# Patient Record
Sex: MALE | Race: WHITE | Employment: OTHER
[De-identification: names, ages, dates, MRNs, and addresses within clinical notes are randomized per-mention and may not be internally consistent; named-entity substitution may affect disease eponyms.]

---

## 2017-01-01 ENCOUNTER — HOME CARE VISIT (OUTPATIENT)
Dept: SCHEDULING | Facility: HOME HEALTH | Age: 77
End: 2017-01-01
Payer: COMMERCIAL

## 2017-01-01 ENCOUNTER — HOME CARE VISIT (OUTPATIENT)
Dept: HOSPICE | Facility: HOSPICE | Age: 77
End: 2017-01-01
Payer: COMMERCIAL

## 2017-01-01 ENCOUNTER — HOSPICE ADMISSION (OUTPATIENT)
Dept: HOSPICE | Facility: HOSPICE | Age: 77
End: 2017-01-01
Payer: COMMERCIAL

## 2017-01-01 ENCOUNTER — HOSPICE CERTIFICATION ENCOUNTER (OUTPATIENT)
Dept: PALLATIVE CARE | Age: 77
End: 2017-01-01

## 2017-01-01 VITALS
DIASTOLIC BLOOD PRESSURE: 58 MMHG | BODY MASS INDEX: 24.92 KG/M2 | HEIGHT: 71 IN | WEIGHT: 178 LBS | HEART RATE: 92 BPM | OXYGEN SATURATION: 97 % | SYSTOLIC BLOOD PRESSURE: 126 MMHG | RESPIRATION RATE: 16 BRPM

## 2017-01-01 VITALS
HEART RATE: 96 BPM | TEMPERATURE: 98.3 F | DIASTOLIC BLOOD PRESSURE: 62 MMHG | OXYGEN SATURATION: 96 % | SYSTOLIC BLOOD PRESSURE: 148 MMHG | RESPIRATION RATE: 18 BRPM

## 2017-01-01 VITALS
DIASTOLIC BLOOD PRESSURE: 56 MMHG | OXYGEN SATURATION: 95 % | RESPIRATION RATE: 18 BRPM | HEART RATE: 75 BPM | TEMPERATURE: 98.2 F | SYSTOLIC BLOOD PRESSURE: 104 MMHG

## 2017-01-01 PROCEDURE — G0299 HHS/HOSPICE OF RN EA 15 MIN: HCPCS

## 2017-01-01 PROCEDURE — 0651 HSPC ROUTINE HOME CARE

## 2017-01-01 PROCEDURE — T4527 ADULT SIZE PULL-ON LG: HCPCS

## 2017-01-01 PROCEDURE — S9470 NUTRITIONAL COUNSELING, DIET: HCPCS

## 2017-01-01 PROCEDURE — G0155 HHCP-SVS OF CSW,EA 15 MIN: HCPCS

## 2017-01-01 PROCEDURE — G0156 HHCP-SVS OF AIDE,EA 15 MIN: HCPCS

## 2017-01-01 PROCEDURE — 3336500001 HSPC ELECTION

## 2017-01-01 PROCEDURE — A6250 SKIN SEAL PROTECT MOISTURIZR: HCPCS

## 2017-01-01 PROCEDURE — T4541 LARGE DISPOSABLE UNDERPAD: HCPCS

## 2017-01-01 PROCEDURE — HOSPICE MEDICATION HC HH HOSPICE MEDICATION

## 2017-12-07 NOTE — CERTIFICATE OF TERMINAL ILLNESS
Hospice Physician Admission Narrative     Principle Hospice Diagnosis: Lewy body dementia with behavioral disturbance  Diagnoses RELATED to the terminal prognosis: weight loss, protein calorie malnutrition  Other Diagnoses:      HOSPICE NARRATIVE COMPOSED BY PHYSICIAN   Do not cut and paste chart information other than imaging findings    Ildefonso George is a 69y.o. year old who was admitted to [facility]. He has suffered for many years with Lewy body dementia diagnosed in 2011. He had to move to a memory facility in May 2017 due to continued decline and agitation. He has had a 15% weight loss (210lbs to 178lbs) in the last 6 months despite encouragement to eat and support for ADLs including feeding at times. He remains very agitated and is unable to sit for any significant time, exhibits paranoid and angry behaviors requiring advanced medication management. He is showing signs of aspiration with coughing with thin liquids and no artificial nutrition is desired. There is variability in FAST from  to [facility] but this is a decline from just the last 3 months. Karnofsky is 40-50 which is also a decline from the last 3 months as well where it was 60-70.       The patient/family chose comfort measures with the support of Hospice.   ______________________________________________________________________

## 2018-01-01 ENCOUNTER — HOME CARE VISIT (OUTPATIENT)
Dept: SCHEDULING | Facility: HOME HEALTH | Age: 78
End: 2018-01-01
Payer: COMMERCIAL

## 2018-01-01 ENCOUNTER — HOME CARE VISIT (OUTPATIENT)
Dept: HOSPICE | Facility: HOSPICE | Age: 78
End: 2018-01-01
Payer: COMMERCIAL

## 2018-01-01 ENCOUNTER — HOSPICE CERTIFICATION ENCOUNTER (OUTPATIENT)
Dept: HOSPICE | Facility: HOSPICE | Age: 78
End: 2018-01-01

## 2018-01-01 ENCOUNTER — HOSPITAL ENCOUNTER (INPATIENT)
Age: 78
LOS: 14 days | End: 2018-03-29
Attending: INTERNAL MEDICINE | Admitting: INTERNAL MEDICINE

## 2018-01-01 VITALS — DIASTOLIC BLOOD PRESSURE: 53 MMHG | SYSTOLIC BLOOD PRESSURE: 93 MMHG | HEART RATE: 72 BPM

## 2018-01-01 VITALS — OXYGEN SATURATION: 94 % | HEART RATE: 78 BPM | RESPIRATION RATE: 18 BRPM | TEMPERATURE: 98.2 F

## 2018-01-01 VITALS — RESPIRATION RATE: 18 BRPM | HEART RATE: 80 BPM

## 2018-01-01 VITALS
SYSTOLIC BLOOD PRESSURE: 132 MMHG | HEART RATE: 93 BPM | RESPIRATION RATE: 18 BRPM | TEMPERATURE: 98 F | DIASTOLIC BLOOD PRESSURE: 70 MMHG | OXYGEN SATURATION: 96 %

## 2018-01-01 VITALS
SYSTOLIC BLOOD PRESSURE: 72 MMHG | TEMPERATURE: 102.3 F | RESPIRATION RATE: 12 BRPM | HEART RATE: 113 BPM | OXYGEN SATURATION: 93 % | DIASTOLIC BLOOD PRESSURE: 38 MMHG

## 2018-01-01 VITALS
HEART RATE: 76 BPM | OXYGEN SATURATION: 99 % | DIASTOLIC BLOOD PRESSURE: 63 MMHG | TEMPERATURE: 97.3 F | RESPIRATION RATE: 16 BRPM | SYSTOLIC BLOOD PRESSURE: 108 MMHG

## 2018-01-01 VITALS — HEART RATE: 84 BPM | OXYGEN SATURATION: 94 % | RESPIRATION RATE: 18 BRPM | TEMPERATURE: 97 F

## 2018-01-01 VITALS
RESPIRATION RATE: 18 BRPM | TEMPERATURE: 97.9 F | SYSTOLIC BLOOD PRESSURE: 114 MMHG | HEART RATE: 76 BPM | DIASTOLIC BLOOD PRESSURE: 68 MMHG | OXYGEN SATURATION: 95 %

## 2018-01-01 VITALS — OXYGEN SATURATION: 95 % | HEART RATE: 72 BPM

## 2018-01-01 VITALS
TEMPERATURE: 97.3 F | HEART RATE: 73 BPM | OXYGEN SATURATION: 98 % | SYSTOLIC BLOOD PRESSURE: 109 MMHG | DIASTOLIC BLOOD PRESSURE: 65 MMHG | RESPIRATION RATE: 16 BRPM

## 2018-01-01 VITALS
RESPIRATION RATE: 16 BRPM | HEART RATE: 82 BPM | OXYGEN SATURATION: 97 % | DIASTOLIC BLOOD PRESSURE: 78 MMHG | TEMPERATURE: 98.3 F | SYSTOLIC BLOOD PRESSURE: 116 MMHG

## 2018-01-01 VITALS — SYSTOLIC BLOOD PRESSURE: 101 MMHG | HEART RATE: 101 BPM | DIASTOLIC BLOOD PRESSURE: 64 MMHG

## 2018-01-01 VITALS — TEMPERATURE: 97 F | RESPIRATION RATE: 18 BRPM | HEART RATE: 88 BPM

## 2018-01-01 VITALS
OXYGEN SATURATION: 95 % | TEMPERATURE: 98 F | SYSTOLIC BLOOD PRESSURE: 128 MMHG | RESPIRATION RATE: 18 BRPM | DIASTOLIC BLOOD PRESSURE: 70 MMHG | HEART RATE: 77 BPM

## 2018-01-01 VITALS — TEMPERATURE: 99.8 F

## 2018-01-01 DIAGNOSIS — G31.83 LEWY BODY DEMENTIA WITH BEHAVIORAL DISTURBANCE (HCC): ICD-10-CM

## 2018-01-01 DIAGNOSIS — R45.1 RESTLESSNESS AND AGITATION: ICD-10-CM

## 2018-01-01 DIAGNOSIS — F02.818 LEWY BODY DEMENTIA WITH BEHAVIORAL DISTURBANCE (HCC): ICD-10-CM

## 2018-01-01 DIAGNOSIS — R52 DIFFUSE PAIN: ICD-10-CM

## 2018-01-01 DIAGNOSIS — R53.83 FATIGUE, UNSPECIFIED TYPE: ICD-10-CM

## 2018-01-01 PROCEDURE — 74011250636 HC RX REV CODE- 250/636: Performed by: NURSE PRACTITIONER

## 2018-01-01 PROCEDURE — G0156 HHCP-SVS OF AIDE,EA 15 MIN: HCPCS

## 2018-01-01 PROCEDURE — 0651 HSPC ROUTINE HOME CARE

## 2018-01-01 PROCEDURE — 74011250637 HC RX REV CODE- 250/637: Performed by: INTERNAL MEDICINE

## 2018-01-01 PROCEDURE — A6250 SKIN SEAL PROTECT MOISTURIZR: HCPCS

## 2018-01-01 PROCEDURE — G0155 HHCP-SVS OF CSW,EA 15 MIN: HCPCS

## 2018-01-01 PROCEDURE — A9270 NON-COVERED ITEM OR SERVICE: HCPCS

## 2018-01-01 PROCEDURE — T4543 ADULT DISP BRIEF/DIAP ABV XL: HCPCS

## 2018-01-01 PROCEDURE — HOSPICE MEDICATION HC HH HOSPICE MEDICATION

## 2018-01-01 PROCEDURE — 3336590001 HSPC ROOM AND BOARD

## 2018-01-01 PROCEDURE — 74011000250 HC RX REV CODE- 250: Performed by: NURSE PRACTITIONER

## 2018-01-01 PROCEDURE — 74011000250 HC RX REV CODE- 250: Performed by: PHYSICAL MEDICINE & REHABILITATION

## 2018-01-01 PROCEDURE — A6413 ADHESIVE BANDAGE, FIRST-AID: HCPCS

## 2018-01-01 PROCEDURE — T4527 ADULT SIZE PULL-ON LG: HCPCS

## 2018-01-01 PROCEDURE — HHS10554 SHAMPOO/BODY WASH 8 OZ ALOE VESTA

## 2018-01-01 PROCEDURE — A4927 NON-STERILE GLOVES: HCPCS

## 2018-01-01 PROCEDURE — G0299 HHS/HOSPICE OF RN EA 15 MIN: HCPCS

## 2018-01-01 PROCEDURE — 74011250637 HC RX REV CODE- 250/637: Performed by: NURSE PRACTITIONER

## 2018-01-01 PROCEDURE — S9470 NUTRITIONAL COUNSELING, DIET: HCPCS

## 2018-01-01 PROCEDURE — T4523 ADULT SIZE BRIEF/DIAPER LG: HCPCS

## 2018-01-01 PROCEDURE — 0656 HSPC GENERAL INPATIENT

## 2018-01-01 PROCEDURE — A4216 STERILE WATER/SALINE, 10 ML: HCPCS

## 2018-01-01 PROCEDURE — T4541 LARGE DISPOSABLE UNDERPAD: HCPCS

## 2018-01-01 PROCEDURE — E0325 URINAL MALE JUG-TYPE: HCPCS

## 2018-01-01 PROCEDURE — A6216 NON-STERILE GAUZE<=16 SQ IN: HCPCS

## 2018-01-01 RX ORDER — DOCUSATE SODIUM 100 MG/1
100 CAPSULE, LIQUID FILLED ORAL 2 TIMES DAILY
Status: DISCONTINUED | OUTPATIENT
Start: 2018-01-01 | End: 2018-01-01

## 2018-01-01 RX ORDER — ATROPINE SULFATE 10 MG/ML
2 SOLUTION/ DROPS OPHTHALMIC
Status: DISCONTINUED | OUTPATIENT
Start: 2018-01-01 | End: 2018-01-01 | Stop reason: HOSPADM

## 2018-01-01 RX ORDER — HALOPERIDOL 2 MG/ML
2 SOLUTION ORAL
Status: DISCONTINUED | OUTPATIENT
Start: 2018-01-01 | End: 2018-01-01

## 2018-01-01 RX ORDER — TAMSULOSIN HYDROCHLORIDE 0.4 MG/1
0.4 CAPSULE ORAL DAILY
Status: DISCONTINUED | OUTPATIENT
Start: 2018-01-01 | End: 2018-01-01

## 2018-01-01 RX ORDER — SERTRALINE HYDROCHLORIDE 50 MG/1
150 TABLET, FILM COATED ORAL DAILY
Status: DISCONTINUED | OUTPATIENT
Start: 2018-01-01 | End: 2018-01-01

## 2018-01-01 RX ORDER — MORPHINE SULFATE 20 MG/ML
5 SOLUTION ORAL
Status: DISCONTINUED | OUTPATIENT
Start: 2018-01-01 | End: 2018-01-01

## 2018-01-01 RX ORDER — FACIAL-BODY WIPES
10 EACH TOPICAL DAILY PRN
Status: DISCONTINUED | OUTPATIENT
Start: 2018-01-01 | End: 2018-01-01 | Stop reason: HOSPADM

## 2018-01-01 RX ORDER — LORAZEPAM 2 MG/ML
0.5 INJECTION INTRAMUSCULAR EVERY 8 HOURS
Status: DISCONTINUED | OUTPATIENT
Start: 2018-01-01 | End: 2018-01-01 | Stop reason: HOSPADM

## 2018-01-01 RX ORDER — HYDROMORPHONE HYDROCHLORIDE 2 MG/ML
1 INJECTION, SOLUTION INTRAMUSCULAR; INTRAVENOUS; SUBCUTANEOUS EVERY 4 HOURS
Status: DISCONTINUED | OUTPATIENT
Start: 2018-01-01 | End: 2018-01-01 | Stop reason: HOSPADM

## 2018-01-01 RX ORDER — BUSPIRONE HYDROCHLORIDE 10 MG/1
10 TABLET ORAL 3 TIMES DAILY
Status: DISCONTINUED | OUTPATIENT
Start: 2018-01-01 | End: 2018-01-01

## 2018-01-01 RX ORDER — HALOPERIDOL 5 MG/ML
5 INJECTION INTRAMUSCULAR ONCE
Status: COMPLETED | OUTPATIENT
Start: 2018-01-01 | End: 2018-01-01

## 2018-01-01 RX ORDER — IBUPROFEN 200 MG
200 TABLET ORAL
Status: DISCONTINUED | OUTPATIENT
Start: 2018-01-01 | End: 2018-01-01

## 2018-01-01 RX ORDER — HALOPERIDOL 2 MG/ML
1 SOLUTION ORAL EVERY 6 HOURS
Status: DISCONTINUED | OUTPATIENT
Start: 2018-01-01 | End: 2018-01-01

## 2018-01-01 RX ORDER — MORPHINE SULFATE 20 MG/ML
5 SOLUTION ORAL EVERY 4 HOURS
Status: DISCONTINUED | OUTPATIENT
Start: 2018-01-01 | End: 2018-01-01

## 2018-01-01 RX ORDER — HYDROMORPHONE HYDROCHLORIDE 2 MG/ML
0.5 INJECTION, SOLUTION INTRAMUSCULAR; INTRAVENOUS; SUBCUTANEOUS
Status: DISCONTINUED | OUTPATIENT
Start: 2018-01-01 | End: 2018-01-01 | Stop reason: HOSPADM

## 2018-01-01 RX ORDER — GLYCOPYRROLATE 0.2 MG/ML
0.2 INJECTION INTRAMUSCULAR; INTRAVENOUS
Status: DISCONTINUED | OUTPATIENT
Start: 2018-01-01 | End: 2018-01-01 | Stop reason: HOSPADM

## 2018-01-01 RX ORDER — DUTASTERIDE 0.5 MG/1
0.5 CAPSULE, LIQUID FILLED ORAL DAILY
Status: DISCONTINUED | OUTPATIENT
Start: 2018-01-01 | End: 2018-01-01

## 2018-01-01 RX ORDER — MORPHINE SULFATE 2 MG/ML
2 INJECTION, SOLUTION INTRAMUSCULAR; INTRAVENOUS
Status: DISCONTINUED | OUTPATIENT
Start: 2018-01-01 | End: 2018-01-01

## 2018-01-01 RX ORDER — HALOPERIDOL 1 MG/1
0.5 TABLET ORAL 2 TIMES DAILY
Status: DISCONTINUED | OUTPATIENT
Start: 2018-01-01 | End: 2018-01-01

## 2018-01-01 RX ORDER — HALOPERIDOL 5 MG/ML
4 INJECTION INTRAMUSCULAR EVERY 6 HOURS
Status: DISCONTINUED | OUTPATIENT
Start: 2018-01-01 | End: 2018-01-01

## 2018-01-01 RX ORDER — SCOLOPAMINE TRANSDERMAL SYSTEM 1 MG/1
1 PATCH, EXTENDED RELEASE TRANSDERMAL
Status: DISCONTINUED | OUTPATIENT
Start: 2018-01-01 | End: 2018-01-01

## 2018-01-01 RX ORDER — TRAZODONE HYDROCHLORIDE 50 MG/1
50 TABLET ORAL
Status: DISCONTINUED | OUTPATIENT
Start: 2018-01-01 | End: 2018-01-01

## 2018-01-01 RX ORDER — SCOLOPAMINE TRANSDERMAL SYSTEM 1 MG/1
1 PATCH, EXTENDED RELEASE TRANSDERMAL
Status: DISCONTINUED | OUTPATIENT
Start: 2018-01-01 | End: 2018-01-01 | Stop reason: HOSPADM

## 2018-01-01 RX ORDER — MORPHINE SULFATE 4 MG/ML
4 INJECTION, SOLUTION INTRAMUSCULAR; INTRAVENOUS EVERY 6 HOURS
Status: DISCONTINUED | OUTPATIENT
Start: 2018-01-01 | End: 2018-01-01

## 2018-01-01 RX ORDER — DIAZEPAM 10 MG/2ML
2.5 INJECTION INTRAMUSCULAR EVERY 8 HOURS
Status: DISCONTINUED | OUTPATIENT
Start: 2018-01-01 | End: 2018-01-01

## 2018-01-01 RX ORDER — ACETAMINOPHEN 325 MG/1
650 TABLET ORAL
Status: DISCONTINUED | OUTPATIENT
Start: 2018-01-01 | End: 2018-01-01

## 2018-01-01 RX ORDER — DIAZEPAM 10 MG/2ML
5 INJECTION INTRAMUSCULAR EVERY 8 HOURS
Status: DISCONTINUED | OUTPATIENT
Start: 2018-01-01 | End: 2018-01-01

## 2018-01-01 RX ORDER — HALOPERIDOL 2 MG/ML
1 SOLUTION ORAL
Status: DISCONTINUED | OUTPATIENT
Start: 2018-01-01 | End: 2018-01-01

## 2018-01-01 RX ORDER — QUETIAPINE FUMARATE 25 MG/1
75 TABLET, FILM COATED ORAL 3 TIMES DAILY
Status: DISCONTINUED | OUTPATIENT
Start: 2018-01-01 | End: 2018-01-01

## 2018-01-01 RX ORDER — CIPROFLOXACIN 500 MG/1
500 TABLET ORAL 2 TIMES DAILY
Status: DISCONTINUED | OUTPATIENT
Start: 2018-01-01 | End: 2018-01-01

## 2018-01-01 RX ORDER — LORAZEPAM 0.5 MG/1
0.5 TABLET ORAL
Status: DISCONTINUED | OUTPATIENT
Start: 2018-01-01 | End: 2018-01-01

## 2018-01-01 RX ORDER — GABAPENTIN 100 MG/1
200 CAPSULE ORAL 3 TIMES DAILY
Status: DISCONTINUED | OUTPATIENT
Start: 2018-01-01 | End: 2018-01-01

## 2018-01-01 RX ORDER — ACETAMINOPHEN 650 MG/1
650 SUPPOSITORY RECTAL
Status: DISCONTINUED | OUTPATIENT
Start: 2018-01-01 | End: 2018-01-01 | Stop reason: HOSPADM

## 2018-01-01 RX ORDER — HALOPERIDOL 1 MG/1
0.5 TABLET ORAL
Status: DISCONTINUED | OUTPATIENT
Start: 2018-01-01 | End: 2018-01-01

## 2018-01-01 RX ORDER — LORAZEPAM 2 MG/ML
0.5 CONCENTRATE ORAL
Status: DISCONTINUED | OUTPATIENT
Start: 2018-01-01 | End: 2018-01-01

## 2018-01-01 RX ORDER — MORPHINE SULFATE 20 MG/ML
5 SOLUTION ORAL EVERY 8 HOURS
Status: DISCONTINUED | OUTPATIENT
Start: 2018-01-01 | End: 2018-01-01

## 2018-01-01 RX ORDER — HALOPERIDOL 5 MG/ML
4 INJECTION INTRAMUSCULAR
Status: DISCONTINUED | OUTPATIENT
Start: 2018-01-01 | End: 2018-01-01 | Stop reason: HOSPADM

## 2018-01-01 RX ORDER — HALOPERIDOL 5 MG/ML
4 INJECTION INTRAMUSCULAR EVERY 6 HOURS
Status: DISCONTINUED | OUTPATIENT
Start: 2018-01-01 | End: 2018-01-01 | Stop reason: HOSPADM

## 2018-01-01 RX ORDER — PHENOBARBITAL SODIUM 65 MG/ML
30 INJECTION INTRAMUSCULAR EVERY 8 HOURS
Status: DISCONTINUED | OUTPATIENT
Start: 2018-01-01 | End: 2018-01-01 | Stop reason: HOSPADM

## 2018-01-01 RX ORDER — HALOPERIDOL 5 MG/ML
2.5 INJECTION INTRAMUSCULAR
Status: DISCONTINUED | OUTPATIENT
Start: 2018-01-01 | End: 2018-01-01

## 2018-01-01 RX ADMIN — LORAZEPAM 0.5 MG: 2 INJECTION INTRAMUSCULAR; INTRAVENOUS at 14:13

## 2018-01-01 RX ADMIN — HYDROMORPHONE HYDROCHLORIDE 1 MG: 1 INJECTION, SOLUTION INTRAMUSCULAR; INTRAVENOUS; SUBCUTANEOUS at 04:19

## 2018-01-01 RX ADMIN — MORPHINE SULFATE 2 MG: 2 INJECTION, SOLUTION INTRAMUSCULAR; INTRAVENOUS at 07:48

## 2018-01-01 RX ADMIN — HYDROMORPHONE HYDROCHLORIDE 1 MG: 1 INJECTION, SOLUTION INTRAMUSCULAR; INTRAVENOUS; SUBCUTANEOUS at 07:36

## 2018-01-01 RX ADMIN — HYDROMORPHONE HYDROCHLORIDE 1 MG: 1 INJECTION, SOLUTION INTRAMUSCULAR; INTRAVENOUS; SUBCUTANEOUS at 00:10

## 2018-01-01 RX ADMIN — HALOPERIDOL LACTATE 2 MG: 2 SOLUTION, CONCENTRATE ORAL at 13:22

## 2018-01-01 RX ADMIN — HYDROMORPHONE HYDROCHLORIDE 1 MG: 1 INJECTION, SOLUTION INTRAMUSCULAR; INTRAVENOUS; SUBCUTANEOUS at 00:56

## 2018-01-01 RX ADMIN — HALOPERIDOL LACTATE 4 MG: 5 INJECTION, SOLUTION INTRAMUSCULAR at 06:19

## 2018-01-01 RX ADMIN — HALOPERIDOL LACTATE 4 MG: 5 INJECTION, SOLUTION INTRAMUSCULAR at 13:00

## 2018-01-01 RX ADMIN — HYDROMORPHONE HYDROCHLORIDE 1 MG: 1 INJECTION, SOLUTION INTRAMUSCULAR; INTRAVENOUS; SUBCUTANEOUS at 16:17

## 2018-01-01 RX ADMIN — MORPHINE SULFATE 5 MG: 20 SOLUTION ORAL at 15:10

## 2018-01-01 RX ADMIN — MORPHINE SULFATE 4 MG: 4 INJECTION, SOLUTION INTRAMUSCULAR; INTRAVENOUS at 06:44

## 2018-01-01 RX ADMIN — HALOPERIDOL LACTATE 2 MG: 2 SOLUTION, CONCENTRATE ORAL at 09:45

## 2018-01-01 RX ADMIN — HALOPERIDOL LACTATE 4 MG: 5 INJECTION, SOLUTION INTRAMUSCULAR at 19:45

## 2018-01-01 RX ADMIN — HALOPERIDOL LACTATE 4 MG: 5 INJECTION, SOLUTION INTRAMUSCULAR at 19:11

## 2018-01-01 RX ADMIN — HYDROMORPHONE HYDROCHLORIDE 1 MG: 1 INJECTION, SOLUTION INTRAMUSCULAR; INTRAVENOUS; SUBCUTANEOUS at 00:14

## 2018-01-01 RX ADMIN — HALOPERIDOL LACTATE 2 MG: 2 SOLUTION, CONCENTRATE ORAL at 21:27

## 2018-01-01 RX ADMIN — LORAZEPAM 0.5 MG: 2 INJECTION INTRAMUSCULAR; INTRAVENOUS at 22:23

## 2018-01-01 RX ADMIN — HALOPERIDOL LACTATE 4 MG: 5 INJECTION, SOLUTION INTRAMUSCULAR at 00:24

## 2018-01-01 RX ADMIN — ATROPINE SULFATE 2 DROP: 10 SOLUTION/ DROPS OPHTHALMIC at 20:43

## 2018-01-01 RX ADMIN — PHENOBARBITAL SODIUM 30 MG: 65 INJECTION INTRAMUSCULAR; INTRAVENOUS at 05:06

## 2018-01-01 RX ADMIN — PHENOBARBITAL SODIUM 30 MG: 65 INJECTION INTRAMUSCULAR; INTRAVENOUS at 22:04

## 2018-01-01 RX ADMIN — HALOPERIDOL LACTATE 2.5 MG: 5 INJECTION, SOLUTION INTRAMUSCULAR at 17:10

## 2018-01-01 RX ADMIN — HYDROMORPHONE HYDROCHLORIDE 1 MG: 1 INJECTION, SOLUTION INTRAMUSCULAR; INTRAVENOUS; SUBCUTANEOUS at 04:02

## 2018-01-01 RX ADMIN — PHENOBARBITAL SODIUM 30 MG: 65 INJECTION INTRAMUSCULAR; INTRAVENOUS at 22:03

## 2018-01-01 RX ADMIN — HALOPERIDOL LACTATE 1 MG: 2 SOLUTION, CONCENTRATE ORAL at 12:00

## 2018-01-01 RX ADMIN — MORPHINE SULFATE 5 MG: 20 SOLUTION ORAL at 08:23

## 2018-01-01 RX ADMIN — HALOPERIDOL LACTATE 4 MG: 5 INJECTION, SOLUTION INTRAMUSCULAR at 20:07

## 2018-01-01 RX ADMIN — HALOPERIDOL LACTATE 4 MG: 5 INJECTION, SOLUTION INTRAMUSCULAR at 19:00

## 2018-01-01 RX ADMIN — LORAZEPAM 0.5 MG: 2 SOLUTION, CONCENTRATE ORAL at 22:19

## 2018-01-01 RX ADMIN — HYDROMORPHONE HYDROCHLORIDE 1 MG: 1 INJECTION, SOLUTION INTRAMUSCULAR; INTRAVENOUS; SUBCUTANEOUS at 12:20

## 2018-01-01 RX ADMIN — HALOPERIDOL LACTATE 2.5 MG: 5 INJECTION, SOLUTION INTRAMUSCULAR at 23:27

## 2018-01-01 RX ADMIN — LORAZEPAM 0.5 MG: 2 INJECTION INTRAMUSCULAR; INTRAVENOUS at 05:05

## 2018-01-01 RX ADMIN — QUETIAPINE FUMARATE 75 MG: 25 TABLET, FILM COATED ORAL at 12:04

## 2018-01-01 RX ADMIN — LORAZEPAM 0.5 MG: 2 INJECTION INTRAMUSCULAR; INTRAVENOUS at 06:01

## 2018-01-01 RX ADMIN — HYDROMORPHONE HYDROCHLORIDE 1 MG: 1 INJECTION, SOLUTION INTRAMUSCULAR; INTRAVENOUS; SUBCUTANEOUS at 19:51

## 2018-01-01 RX ADMIN — LORAZEPAM 0.5 MG: 2 SOLUTION, CONCENTRATE ORAL at 14:57

## 2018-01-01 RX ADMIN — MORPHINE SULFATE 5 MG: 20 SOLUTION ORAL at 16:30

## 2018-01-01 RX ADMIN — CIPROFLOXACIN 500 MG: 500 TABLET ORAL at 12:04

## 2018-01-01 RX ADMIN — MORPHINE SULFATE 5 MG: 20 SOLUTION ORAL at 05:34

## 2018-01-01 RX ADMIN — HYDROMORPHONE HYDROCHLORIDE 1 MG: 1 INJECTION, SOLUTION INTRAMUSCULAR; INTRAVENOUS; SUBCUTANEOUS at 20:07

## 2018-01-01 RX ADMIN — MORPHINE SULFATE 5 MG: 20 SOLUTION ORAL at 04:04

## 2018-01-01 RX ADMIN — GLYCOPYRROLATE 0.2 MG: 0.2 INJECTION INTRAMUSCULAR; INTRAVENOUS at 09:38

## 2018-01-01 RX ADMIN — LORAZEPAM 0.5 MG: 2 SOLUTION, CONCENTRATE ORAL at 16:29

## 2018-01-01 RX ADMIN — HYDROMORPHONE HYDROCHLORIDE 1 MG: 1 INJECTION, SOLUTION INTRAMUSCULAR; INTRAVENOUS; SUBCUTANEOUS at 12:27

## 2018-01-01 RX ADMIN — HALOPERIDOL LACTATE 4 MG: 5 INJECTION, SOLUTION INTRAMUSCULAR at 06:47

## 2018-01-01 RX ADMIN — MORPHINE SULFATE 5 MG: 20 SOLUTION ORAL at 06:08

## 2018-01-01 RX ADMIN — HYDROMORPHONE HYDROCHLORIDE 0.5 MG: 1 INJECTION, SOLUTION INTRAMUSCULAR; INTRAVENOUS; SUBCUTANEOUS at 18:28

## 2018-01-01 RX ADMIN — LORAZEPAM 0.5 MG: 2 INJECTION INTRAMUSCULAR; INTRAVENOUS at 21:40

## 2018-01-01 RX ADMIN — HALOPERIDOL LACTATE 1 MG: 2 SOLUTION, CONCENTRATE ORAL at 12:08

## 2018-01-01 RX ADMIN — MORPHINE SULFATE 2 MG: 2 INJECTION, SOLUTION INTRAMUSCULAR; INTRAVENOUS at 22:12

## 2018-01-01 RX ADMIN — HYDROMORPHONE HYDROCHLORIDE 1 MG: 1 INJECTION, SOLUTION INTRAMUSCULAR; INTRAVENOUS; SUBCUTANEOUS at 08:02

## 2018-01-01 RX ADMIN — MORPHINE SULFATE 5 MG: 20 SOLUTION ORAL at 13:35

## 2018-01-01 RX ADMIN — PHENOBARBITAL SODIUM 30 MG: 65 INJECTION INTRAMUSCULAR; INTRAVENOUS at 13:20

## 2018-01-01 RX ADMIN — LORAZEPAM 0.5 MG: 2 INJECTION INTRAMUSCULAR; INTRAVENOUS at 22:03

## 2018-01-01 RX ADMIN — MORPHINE SULFATE 5 MG: 20 SOLUTION ORAL at 20:40

## 2018-01-01 RX ADMIN — HYDROMORPHONE HYDROCHLORIDE 1 MG: 1 INJECTION, SOLUTION INTRAMUSCULAR; INTRAVENOUS; SUBCUTANEOUS at 16:27

## 2018-01-01 RX ADMIN — QUETIAPINE FUMARATE 75 MG: 25 TABLET, FILM COATED ORAL at 22:54

## 2018-01-01 RX ADMIN — HALOPERIDOL LACTATE 4 MG: 5 INJECTION, SOLUTION INTRAMUSCULAR at 00:15

## 2018-01-01 RX ADMIN — MORPHINE SULFATE 2 MG: 2 INJECTION, SOLUTION INTRAMUSCULAR; INTRAVENOUS at 12:43

## 2018-01-01 RX ADMIN — HALOPERIDOL LACTATE 4 MG: 5 INJECTION, SOLUTION INTRAMUSCULAR at 12:07

## 2018-01-01 RX ADMIN — HYDROMORPHONE HYDROCHLORIDE 1 MG: 1 INJECTION, SOLUTION INTRAMUSCULAR; INTRAVENOUS; SUBCUTANEOUS at 15:52

## 2018-01-01 RX ADMIN — HYDROMORPHONE HYDROCHLORIDE 1 MG: 1 INJECTION, SOLUTION INTRAMUSCULAR; INTRAVENOUS; SUBCUTANEOUS at 16:07

## 2018-01-01 RX ADMIN — HALOPERIDOL LACTATE 4 MG: 5 INJECTION, SOLUTION INTRAMUSCULAR at 06:20

## 2018-01-01 RX ADMIN — PHENOBARBITAL SODIUM 30 MG: 65 INJECTION INTRAMUSCULAR; INTRAVENOUS at 22:23

## 2018-01-01 RX ADMIN — HYDROMORPHONE HYDROCHLORIDE 1 MG: 1 INJECTION, SOLUTION INTRAMUSCULAR; INTRAVENOUS; SUBCUTANEOUS at 00:25

## 2018-01-01 RX ADMIN — HALOPERIDOL LACTATE 1 MG: 2 SOLUTION, CONCENTRATE ORAL at 23:53

## 2018-01-01 RX ADMIN — MORPHINE SULFATE 2 MG: 2 INJECTION, SOLUTION INTRAMUSCULAR; INTRAVENOUS at 11:29

## 2018-01-01 RX ADMIN — LORAZEPAM 0.5 MG: 2 SOLUTION, CONCENTRATE ORAL at 11:18

## 2018-01-01 RX ADMIN — HALOPERIDOL LACTATE 4 MG: 5 INJECTION, SOLUTION INTRAMUSCULAR at 00:33

## 2018-01-01 RX ADMIN — HYDROMORPHONE HYDROCHLORIDE 1 MG: 1 INJECTION, SOLUTION INTRAMUSCULAR; INTRAVENOUS; SUBCUTANEOUS at 20:15

## 2018-01-01 RX ADMIN — HALOPERIDOL LACTATE 2.5 MG: 5 INJECTION, SOLUTION INTRAMUSCULAR at 09:33

## 2018-01-01 RX ADMIN — HALOPERIDOL LACTATE 4 MG: 5 INJECTION INTRAMUSCULAR at 09:38

## 2018-01-01 RX ADMIN — HYDROMORPHONE HYDROCHLORIDE 1 MG: 1 INJECTION, SOLUTION INTRAMUSCULAR; INTRAVENOUS; SUBCUTANEOUS at 19:50

## 2018-01-01 RX ADMIN — HALOPERIDOL LACTATE 4 MG: 5 INJECTION, SOLUTION INTRAMUSCULAR at 06:32

## 2018-01-01 RX ADMIN — HALOPERIDOL LACTATE 4 MG: 5 INJECTION, SOLUTION INTRAMUSCULAR at 11:54

## 2018-01-01 RX ADMIN — HYDROMORPHONE HYDROCHLORIDE 1 MG: 1 INJECTION, SOLUTION INTRAMUSCULAR; INTRAVENOUS; SUBCUTANEOUS at 07:55

## 2018-01-01 RX ADMIN — LORAZEPAM 0.5 MG: 2 INJECTION INTRAMUSCULAR; INTRAVENOUS at 22:04

## 2018-01-01 RX ADMIN — HYDROMORPHONE HYDROCHLORIDE 1 MG: 1 INJECTION, SOLUTION INTRAMUSCULAR; INTRAVENOUS; SUBCUTANEOUS at 05:06

## 2018-01-01 RX ADMIN — HALOPERIDOL LACTATE 4 MG: 5 INJECTION, SOLUTION INTRAMUSCULAR at 00:10

## 2018-01-01 RX ADMIN — HYDROMORPHONE HYDROCHLORIDE 1 MG: 1 INJECTION, SOLUTION INTRAMUSCULAR; INTRAVENOUS; SUBCUTANEOUS at 16:18

## 2018-01-01 RX ADMIN — HALOPERIDOL LACTATE 2.5 MG: 5 INJECTION, SOLUTION INTRAMUSCULAR at 07:23

## 2018-01-01 RX ADMIN — MORPHINE SULFATE 2 MG: 2 INJECTION, SOLUTION INTRAMUSCULAR; INTRAVENOUS at 09:28

## 2018-01-01 RX ADMIN — MORPHINE SULFATE 5 MG: 20 SOLUTION ORAL at 12:00

## 2018-01-01 RX ADMIN — HALOPERIDOL LACTATE 4 MG: 5 INJECTION, SOLUTION INTRAMUSCULAR at 00:56

## 2018-01-01 RX ADMIN — PHENOBARBITAL SODIUM 30 MG: 65 INJECTION INTRAMUSCULAR; INTRAVENOUS at 21:26

## 2018-01-01 RX ADMIN — HALOPERIDOL LACTATE 2.5 MG: 5 INJECTION, SOLUTION INTRAMUSCULAR at 04:46

## 2018-01-01 RX ADMIN — PHENOBARBITAL SODIUM 30 MG: 65 INJECTION INTRAMUSCULAR; INTRAVENOUS at 21:59

## 2018-01-01 RX ADMIN — LORAZEPAM 0.5 MG: 2 INJECTION INTRAMUSCULAR; INTRAVENOUS at 05:10

## 2018-01-01 RX ADMIN — HALOPERIDOL LACTATE 5 MG: 5 INJECTION INTRAMUSCULAR at 13:57

## 2018-01-01 RX ADMIN — MORPHINE SULFATE 4 MG: 4 INJECTION, SOLUTION INTRAMUSCULAR; INTRAVENOUS at 13:00

## 2018-01-01 RX ADMIN — MORPHINE SULFATE 4 MG: 4 INJECTION, SOLUTION INTRAMUSCULAR; INTRAVENOUS at 01:00

## 2018-01-01 RX ADMIN — HALOPERIDOL LACTATE 4 MG: 5 INJECTION INTRAMUSCULAR at 08:06

## 2018-01-01 RX ADMIN — PHENOBARBITAL SODIUM 30 MG: 65 INJECTION INTRAMUSCULAR; INTRAVENOUS at 06:23

## 2018-01-01 RX ADMIN — GABAPENTIN 200 MG: 100 CAPSULE ORAL at 22:54

## 2018-01-01 RX ADMIN — HYDROMORPHONE HYDROCHLORIDE 1 MG: 1 INJECTION, SOLUTION INTRAMUSCULAR; INTRAVENOUS; SUBCUTANEOUS at 04:03

## 2018-01-01 RX ADMIN — HYDROMORPHONE HYDROCHLORIDE 1 MG: 1 INJECTION, SOLUTION INTRAMUSCULAR; INTRAVENOUS; SUBCUTANEOUS at 08:00

## 2018-01-01 RX ADMIN — LORAZEPAM 0.5 MG: 0.5 TABLET ORAL at 13:35

## 2018-01-01 RX ADMIN — QUETIAPINE FUMARATE 75 MG: 25 TABLET, FILM COATED ORAL at 15:42

## 2018-01-01 RX ADMIN — LORAZEPAM 0.5 MG: 2 SOLUTION, CONCENTRATE ORAL at 23:44

## 2018-01-01 RX ADMIN — HYDROMORPHONE HYDROCHLORIDE 1 MG: 1 INJECTION, SOLUTION INTRAMUSCULAR; INTRAVENOUS; SUBCUTANEOUS at 08:06

## 2018-01-01 RX ADMIN — MORPHINE SULFATE 2 MG: 2 INJECTION, SOLUTION INTRAMUSCULAR; INTRAVENOUS at 05:44

## 2018-01-01 RX ADMIN — HYDROMORPHONE HYDROCHLORIDE 1 MG: 1 INJECTION, SOLUTION INTRAMUSCULAR; INTRAVENOUS; SUBCUTANEOUS at 11:53

## 2018-01-01 RX ADMIN — TAMSULOSIN HYDROCHLORIDE 0.4 MG: 0.4 CAPSULE ORAL at 12:04

## 2018-01-01 RX ADMIN — MORPHINE SULFATE 5 MG: 20 SOLUTION ORAL at 22:19

## 2018-01-01 RX ADMIN — MORPHINE SULFATE 5 MG: 20 SOLUTION ORAL at 08:02

## 2018-01-01 RX ADMIN — MORPHINE SULFATE 5 MG: 20 SOLUTION ORAL at 14:57

## 2018-01-01 RX ADMIN — HYDROMORPHONE HYDROCHLORIDE 1 MG: 1 INJECTION, SOLUTION INTRAMUSCULAR; INTRAVENOUS; SUBCUTANEOUS at 08:01

## 2018-01-01 RX ADMIN — HYDROMORPHONE HYDROCHLORIDE 1 MG: 1 INJECTION, SOLUTION INTRAMUSCULAR; INTRAVENOUS; SUBCUTANEOUS at 08:12

## 2018-01-01 RX ADMIN — HYDROMORPHONE HYDROCHLORIDE 1 MG: 1 INJECTION, SOLUTION INTRAMUSCULAR; INTRAVENOUS; SUBCUTANEOUS at 00:24

## 2018-01-01 RX ADMIN — PHENOBARBITAL SODIUM 30 MG: 65 INJECTION INTRAMUSCULAR; INTRAVENOUS at 13:41

## 2018-01-01 RX ADMIN — LORAZEPAM 0.5 MG: 2 INJECTION INTRAMUSCULAR; INTRAVENOUS at 21:26

## 2018-01-01 RX ADMIN — HYDROMORPHONE HYDROCHLORIDE 1 MG: 1 INJECTION, SOLUTION INTRAMUSCULAR; INTRAVENOUS; SUBCUTANEOUS at 04:10

## 2018-01-01 RX ADMIN — MORPHINE SULFATE 2 MG: 2 INJECTION, SOLUTION INTRAMUSCULAR; INTRAVENOUS at 13:57

## 2018-01-01 RX ADMIN — HALOPERIDOL LACTATE 1 MG: 2 SOLUTION, CONCENTRATE ORAL at 17:06

## 2018-01-01 RX ADMIN — HALOPERIDOL LACTATE 4 MG: 5 INJECTION, SOLUTION INTRAMUSCULAR at 12:31

## 2018-01-01 RX ADMIN — HALOPERIDOL LACTATE 2.5 MG: 5 INJECTION, SOLUTION INTRAMUSCULAR at 16:15

## 2018-01-01 RX ADMIN — MORPHINE SULFATE 2 MG: 2 INJECTION, SOLUTION INTRAMUSCULAR; INTRAVENOUS at 16:15

## 2018-01-01 RX ADMIN — PHENOBARBITAL SODIUM 30 MG: 65 INJECTION INTRAMUSCULAR; INTRAVENOUS at 06:15

## 2018-01-01 RX ADMIN — HALOPERIDOL LACTATE 4 MG: 5 INJECTION, SOLUTION INTRAMUSCULAR at 06:31

## 2018-01-01 RX ADMIN — HALOPERIDOL LACTATE 4 MG: 5 INJECTION, SOLUTION INTRAMUSCULAR at 12:43

## 2018-01-01 RX ADMIN — LORAZEPAM 0.5 MG: 2 INJECTION INTRAMUSCULAR; INTRAVENOUS at 06:39

## 2018-01-01 RX ADMIN — MORPHINE SULFATE 2 MG: 2 INJECTION, SOLUTION INTRAMUSCULAR; INTRAVENOUS at 04:47

## 2018-01-01 RX ADMIN — MORPHINE SULFATE 2 MG: 2 INJECTION, SOLUTION INTRAMUSCULAR; INTRAVENOUS at 20:43

## 2018-01-01 RX ADMIN — HYDROMORPHONE HYDROCHLORIDE 1 MG: 1 INJECTION, SOLUTION INTRAMUSCULAR; INTRAVENOUS; SUBCUTANEOUS at 00:33

## 2018-01-01 RX ADMIN — PHENOBARBITAL SODIUM 30 MG: 65 INJECTION INTRAMUSCULAR; INTRAVENOUS at 14:29

## 2018-01-01 RX ADMIN — HALOPERIDOL LACTATE 4 MG: 5 INJECTION, SOLUTION INTRAMUSCULAR at 19:57

## 2018-01-01 RX ADMIN — HYDROMORPHONE HYDROCHLORIDE 1 MG: 1 INJECTION, SOLUTION INTRAMUSCULAR; INTRAVENOUS; SUBCUTANEOUS at 12:31

## 2018-01-01 RX ADMIN — LORAZEPAM 0.5 MG: 2 INJECTION INTRAMUSCULAR; INTRAVENOUS at 14:03

## 2018-01-01 RX ADMIN — HYDROMORPHONE HYDROCHLORIDE 0.5 MG: 1 INJECTION, SOLUTION INTRAMUSCULAR; INTRAVENOUS; SUBCUTANEOUS at 13:21

## 2018-01-01 RX ADMIN — LORAZEPAM 0.5 MG: 2 SOLUTION, CONCENTRATE ORAL at 09:45

## 2018-01-01 RX ADMIN — HALOPERIDOL LACTATE 4 MG: 5 INJECTION, SOLUTION INTRAMUSCULAR at 01:00

## 2018-01-01 RX ADMIN — PHENOBARBITAL SODIUM 30 MG: 65 INJECTION INTRAMUSCULAR; INTRAVENOUS at 06:57

## 2018-01-01 RX ADMIN — LORAZEPAM 0.5 MG: 2 SOLUTION, CONCENTRATE ORAL at 13:31

## 2018-01-01 RX ADMIN — SERTRALINE HYDROCHLORIDE 150 MG: 50 TABLET ORAL at 12:04

## 2018-01-01 RX ADMIN — HYDROMORPHONE HYDROCHLORIDE 1 MG: 1 INJECTION, SOLUTION INTRAMUSCULAR; INTRAVENOUS; SUBCUTANEOUS at 03:37

## 2018-01-01 RX ADMIN — HALOPERIDOL LACTATE 4 MG: 5 INJECTION, SOLUTION INTRAMUSCULAR at 12:46

## 2018-01-01 RX ADMIN — BUSPIRONE HYDROCHLORIDE 10 MG: 10 TABLET ORAL at 12:05

## 2018-01-01 RX ADMIN — MORPHINE SULFATE 2 MG: 2 INJECTION, SOLUTION INTRAMUSCULAR; INTRAVENOUS at 17:09

## 2018-01-01 RX ADMIN — HALOPERIDOL LACTATE 4 MG: 5 INJECTION, SOLUTION INTRAMUSCULAR at 06:15

## 2018-01-01 RX ADMIN — MORPHINE SULFATE 5 MG: 20 SOLUTION ORAL at 22:55

## 2018-01-01 RX ADMIN — PHENOBARBITAL SODIUM 30 MG: 65 INJECTION INTRAMUSCULAR; INTRAVENOUS at 22:05

## 2018-01-01 RX ADMIN — LORAZEPAM 0.5 MG: 2 INJECTION INTRAMUSCULAR; INTRAVENOUS at 06:15

## 2018-01-01 RX ADMIN — MORPHINE SULFATE 2 MG: 2 INJECTION, SOLUTION INTRAMUSCULAR; INTRAVENOUS at 07:23

## 2018-01-01 RX ADMIN — HYDROMORPHONE HYDROCHLORIDE 1 MG: 1 INJECTION, SOLUTION INTRAMUSCULAR; INTRAVENOUS; SUBCUTANEOUS at 19:46

## 2018-01-01 RX ADMIN — HALOPERIDOL LACTATE 4 MG: 5 INJECTION, SOLUTION INTRAMUSCULAR at 01:25

## 2018-01-01 RX ADMIN — HYDROMORPHONE HYDROCHLORIDE 1 MG: 1 INJECTION, SOLUTION INTRAMUSCULAR; INTRAVENOUS; SUBCUTANEOUS at 12:04

## 2018-01-01 RX ADMIN — HYDROMORPHONE HYDROCHLORIDE 1 MG: 1 INJECTION, SOLUTION INTRAMUSCULAR; INTRAVENOUS; SUBCUTANEOUS at 12:07

## 2018-01-01 RX ADMIN — ATROPINE SULFATE 2 DROP: 10 SOLUTION/ DROPS OPHTHALMIC at 03:39

## 2018-01-01 RX ADMIN — MORPHINE SULFATE 5 MG: 20 SOLUTION ORAL at 21:29

## 2018-01-01 RX ADMIN — MORPHINE SULFATE 2 MG: 2 INJECTION, SOLUTION INTRAMUSCULAR; INTRAVENOUS at 09:53

## 2018-01-01 RX ADMIN — BUSPIRONE HYDROCHLORIDE 10 MG: 10 TABLET ORAL at 22:00

## 2018-01-01 RX ADMIN — LORAZEPAM 0.5 MG: 2 INJECTION INTRAMUSCULAR; INTRAVENOUS at 13:20

## 2018-01-01 RX ADMIN — PHENOBARBITAL SODIUM 30 MG: 65 INJECTION INTRAMUSCULAR; INTRAVENOUS at 13:56

## 2018-01-01 RX ADMIN — MORPHINE SULFATE 5 MG: 20 SOLUTION ORAL at 23:52

## 2018-01-01 RX ADMIN — GABAPENTIN 200 MG: 100 CAPSULE ORAL at 15:42

## 2018-01-01 RX ADMIN — ATROPINE SULFATE 2 DROP: 10 SOLUTION/ DROPS OPHTHALMIC at 00:46

## 2018-01-01 RX ADMIN — MORPHINE SULFATE 2 MG: 2 INJECTION, SOLUTION INTRAMUSCULAR; INTRAVENOUS at 14:07

## 2018-01-01 RX ADMIN — PHENOBARBITAL SODIUM 30 MG: 65 INJECTION INTRAMUSCULAR; INTRAVENOUS at 06:02

## 2018-01-01 RX ADMIN — LORAZEPAM 0.5 MG: 2 INJECTION INTRAMUSCULAR; INTRAVENOUS at 06:23

## 2018-01-01 RX ADMIN — HYDROMORPHONE HYDROCHLORIDE 1 MG: 1 INJECTION, SOLUTION INTRAMUSCULAR; INTRAVENOUS; SUBCUTANEOUS at 23:59

## 2018-01-01 RX ADMIN — HALOPERIDOL LACTATE 1 MG: 2 SOLUTION, CONCENTRATE ORAL at 06:48

## 2018-01-01 RX ADMIN — HALOPERIDOL LACTATE 4 MG: 5 INJECTION, SOLUTION INTRAMUSCULAR at 00:25

## 2018-01-01 RX ADMIN — LORAZEPAM 0.5 MG: 2 INJECTION INTRAMUSCULAR; INTRAVENOUS at 22:10

## 2018-01-01 RX ADMIN — HALOPERIDOL LACTATE 2.5 MG: 5 INJECTION, SOLUTION INTRAMUSCULAR at 20:42

## 2018-01-01 RX ADMIN — HYDROMORPHONE HYDROCHLORIDE 0.5 MG: 1 INJECTION, SOLUTION INTRAMUSCULAR; INTRAVENOUS; SUBCUTANEOUS at 15:41

## 2018-01-01 RX ADMIN — LORAZEPAM 0.5 MG: 2 INJECTION INTRAMUSCULAR; INTRAVENOUS at 13:42

## 2018-01-01 RX ADMIN — MORPHINE SULFATE 4 MG: 4 INJECTION, SOLUTION INTRAMUSCULAR; INTRAVENOUS at 13:33

## 2018-01-01 RX ADMIN — HALOPERIDOL LACTATE 4 MG: 5 INJECTION, SOLUTION INTRAMUSCULAR at 12:20

## 2018-01-01 RX ADMIN — ATROPINE SULFATE 2 DROP: 10 SOLUTION/ DROPS OPHTHALMIC at 11:26

## 2018-01-01 RX ADMIN — LORAZEPAM 0.5 MG: 2 SOLUTION, CONCENTRATE ORAL at 12:43

## 2018-01-01 RX ADMIN — HALOPERIDOL LACTATE 4 MG: 5 INJECTION, SOLUTION INTRAMUSCULAR at 18:43

## 2018-01-01 RX ADMIN — HALOPERIDOL LACTATE 2.5 MG: 5 INJECTION, SOLUTION INTRAMUSCULAR at 04:41

## 2018-01-01 RX ADMIN — HYDROMORPHONE HYDROCHLORIDE 1 MG: 1 INJECTION, SOLUTION INTRAMUSCULAR; INTRAVENOUS; SUBCUTANEOUS at 09:17

## 2018-01-01 RX ADMIN — HYDROMORPHONE HYDROCHLORIDE 1 MG: 1 INJECTION, SOLUTION INTRAMUSCULAR; INTRAVENOUS; SUBCUTANEOUS at 03:56

## 2018-01-01 RX ADMIN — HYDROMORPHONE HYDROCHLORIDE 1 MG: 1 INJECTION, SOLUTION INTRAMUSCULAR; INTRAVENOUS; SUBCUTANEOUS at 03:25

## 2018-01-01 RX ADMIN — PHENOBARBITAL SODIUM 30 MG: 65 INJECTION INTRAMUSCULAR; INTRAVENOUS at 14:04

## 2018-01-01 RX ADMIN — HYDROMORPHONE HYDROCHLORIDE 1 MG: 1 INJECTION, SOLUTION INTRAMUSCULAR; INTRAVENOUS; SUBCUTANEOUS at 19:41

## 2018-01-01 RX ADMIN — HYDROMORPHONE HYDROCHLORIDE 0.5 MG: 1 INJECTION, SOLUTION INTRAMUSCULAR; INTRAVENOUS; SUBCUTANEOUS at 09:38

## 2018-01-01 RX ADMIN — PHENOBARBITAL SODIUM 30 MG: 65 INJECTION INTRAMUSCULAR; INTRAVENOUS at 06:38

## 2018-01-01 RX ADMIN — HYDROMORPHONE HYDROCHLORIDE 1 MG: 1 INJECTION, SOLUTION INTRAMUSCULAR; INTRAVENOUS; SUBCUTANEOUS at 11:59

## 2018-01-01 RX ADMIN — MORPHINE SULFATE 5 MG: 20 SOLUTION ORAL at 17:06

## 2018-01-01 RX ADMIN — MORPHINE SULFATE 2 MG: 2 INJECTION, SOLUTION INTRAMUSCULAR; INTRAVENOUS at 18:39

## 2018-01-01 RX ADMIN — PHENOBARBITAL SODIUM 30 MG: 65 INJECTION INTRAMUSCULAR; INTRAVENOUS at 14:46

## 2018-01-01 RX ADMIN — HYDROMORPHONE HYDROCHLORIDE 1 MG: 1 INJECTION, SOLUTION INTRAMUSCULAR; INTRAVENOUS; SUBCUTANEOUS at 20:22

## 2018-01-01 RX ADMIN — HALOPERIDOL LACTATE 4 MG: 5 INJECTION, SOLUTION INTRAMUSCULAR at 07:32

## 2018-01-01 RX ADMIN — MORPHINE SULFATE 5 MG: 20 SOLUTION ORAL at 12:08

## 2018-01-01 RX ADMIN — HALOPERIDOL LACTATE 2 MG: 2 SOLUTION, CONCENTRATE ORAL at 08:23

## 2018-01-01 RX ADMIN — PHENOBARBITAL SODIUM 30 MG: 65 INJECTION INTRAMUSCULAR; INTRAVENOUS at 13:53

## 2018-01-01 RX ADMIN — PHENOBARBITAL SODIUM 30 MG: 65 INJECTION INTRAMUSCULAR; INTRAVENOUS at 21:38

## 2018-01-01 RX ADMIN — HALOPERIDOL LACTATE 4 MG: 5 INJECTION INTRAMUSCULAR at 18:28

## 2018-01-01 RX ADMIN — LORAZEPAM 0.5 MG: 2 INJECTION INTRAMUSCULAR; INTRAVENOUS at 22:01

## 2018-01-01 RX ADMIN — GLYCOPYRROLATE 0.2 MG: 0.2 INJECTION INTRAMUSCULAR; INTRAVENOUS at 14:53

## 2018-01-01 RX ADMIN — LORAZEPAM 0.5 MG: 2 INJECTION INTRAMUSCULAR; INTRAVENOUS at 13:55

## 2018-01-01 RX ADMIN — HYDROMORPHONE HYDROCHLORIDE 1 MG: 1 INJECTION, SOLUTION INTRAMUSCULAR; INTRAVENOUS; SUBCUTANEOUS at 05:10

## 2018-01-01 RX ADMIN — MORPHINE SULFATE 2 MG: 2 INJECTION, SOLUTION INTRAMUSCULAR; INTRAVENOUS at 23:29

## 2018-01-01 RX ADMIN — MORPHINE SULFATE 5 MG: 20 SOLUTION ORAL at 18:28

## 2018-01-01 RX ADMIN — PHENOBARBITAL SODIUM 30 MG: 65 INJECTION INTRAMUSCULAR; INTRAVENOUS at 06:33

## 2018-01-01 RX ADMIN — GABAPENTIN 200 MG: 100 CAPSULE ORAL at 12:01

## 2018-01-01 RX ADMIN — MORPHINE SULFATE 5 MG: 20 SOLUTION ORAL at 23:51

## 2018-01-01 RX ADMIN — HALOPERIDOL LACTATE 4 MG: 5 INJECTION, SOLUTION INTRAMUSCULAR at 19:04

## 2018-01-01 RX ADMIN — PHENOBARBITAL SODIUM 30 MG: 65 INJECTION INTRAMUSCULAR; INTRAVENOUS at 22:09

## 2018-01-01 RX ADMIN — MORPHINE SULFATE 5 MG: 20 SOLUTION ORAL at 14:49

## 2018-01-01 RX ADMIN — HALOPERIDOL LACTATE 4 MG: 5 INJECTION, SOLUTION INTRAMUSCULAR at 12:28

## 2018-01-01 RX ADMIN — HALOPERIDOL LACTATE 2.5 MG: 5 INJECTION, SOLUTION INTRAMUSCULAR at 11:26

## 2018-01-01 RX ADMIN — LORAZEPAM 0.5 MG: 2 SOLUTION, CONCENTRATE ORAL at 11:29

## 2018-01-01 RX ADMIN — MORPHINE SULFATE 4 MG: 4 INJECTION, SOLUTION INTRAMUSCULAR; INTRAVENOUS at 19:10

## 2018-01-01 RX ADMIN — MORPHINE SULFATE 5 MG: 20 SOLUTION ORAL at 19:35

## 2018-01-01 RX ADMIN — MORPHINE SULFATE 5 MG: 20 SOLUTION ORAL at 20:13

## 2018-01-01 RX ADMIN — MORPHINE SULFATE 5 MG: 20 SOLUTION ORAL at 13:21

## 2018-01-01 RX ADMIN — HALOPERIDOL LACTATE 4 MG: 5 INJECTION, SOLUTION INTRAMUSCULAR at 00:27

## 2018-01-01 RX ADMIN — HALOPERIDOL LACTATE 4 MG: 5 INJECTION, SOLUTION INTRAMUSCULAR at 06:24

## 2018-01-01 RX ADMIN — HYDROMORPHONE HYDROCHLORIDE 1 MG: 1 INJECTION, SOLUTION INTRAMUSCULAR; INTRAVENOUS; SUBCUTANEOUS at 00:27

## 2018-01-01 RX ADMIN — GLYCOPYRROLATE 0.2 MG: 0.2 INJECTION INTRAMUSCULAR; INTRAVENOUS at 00:39

## 2018-01-01 RX ADMIN — LORAZEPAM 0.5 MG: 2 INJECTION INTRAMUSCULAR; INTRAVENOUS at 13:53

## 2018-01-01 RX ADMIN — LORAZEPAM 0.5 MG: 2 SOLUTION, CONCENTRATE ORAL at 20:40

## 2018-01-01 RX ADMIN — HALOPERIDOL LACTATE 4 MG: 5 INJECTION, SOLUTION INTRAMUSCULAR at 13:31

## 2018-01-01 RX ADMIN — HALOPERIDOL LACTATE 4 MG: 5 INJECTION, SOLUTION INTRAMUSCULAR at 00:09

## 2018-01-01 RX ADMIN — HALOPERIDOL LACTATE 4 MG: 5 INJECTION INTRAMUSCULAR at 15:41

## 2018-01-01 RX ADMIN — LORAZEPAM 0.5 MG: 2 INJECTION INTRAMUSCULAR; INTRAVENOUS at 06:32

## 2018-01-01 RX ADMIN — HALOPERIDOL LACTATE 4 MG: 5 INJECTION, SOLUTION INTRAMUSCULAR at 06:41

## 2018-01-01 RX ADMIN — HALOPERIDOL LACTATE 4 MG: 5 INJECTION, SOLUTION INTRAMUSCULAR at 18:42

## 2018-01-01 RX ADMIN — LORAZEPAM 0.5 MG: 2 SOLUTION, CONCENTRATE ORAL at 23:52

## 2018-01-01 RX ADMIN — LORAZEPAM 0.5 MG: 2 INJECTION INTRAMUSCULAR; INTRAVENOUS at 14:45

## 2018-01-01 RX ADMIN — HYDROMORPHONE HYDROCHLORIDE 1 MG: 1 INJECTION, SOLUTION INTRAMUSCULAR; INTRAVENOUS; SUBCUTANEOUS at 00:08

## 2018-01-01 RX ADMIN — MORPHINE SULFATE 2 MG: 2 INJECTION, SOLUTION INTRAMUSCULAR; INTRAVENOUS at 09:33

## 2018-01-01 RX ADMIN — HYDROMORPHONE HYDROCHLORIDE 1 MG: 1 INJECTION, SOLUTION INTRAMUSCULAR; INTRAVENOUS; SUBCUTANEOUS at 17:51

## 2018-01-01 RX ADMIN — ATROPINE SULFATE 2 DROP: 10 SOLUTION/ DROPS OPHTHALMIC at 16:19

## 2018-01-01 RX ADMIN — CIPROFLOXACIN 500 MG: 500 TABLET ORAL at 21:00

## 2018-01-01 RX ADMIN — MORPHINE SULFATE 5 MG: 20 SOLUTION ORAL at 11:18

## 2018-01-01 RX ADMIN — LORAZEPAM 0.5 MG: 2 INJECTION INTRAMUSCULAR; INTRAVENOUS at 14:30

## 2018-01-01 RX ADMIN — MORPHINE SULFATE 2 MG: 2 INJECTION, SOLUTION INTRAMUSCULAR; INTRAVENOUS at 02:10

## 2018-01-01 RX ADMIN — HYDROMORPHONE HYDROCHLORIDE 1 MG: 1 INJECTION, SOLUTION INTRAMUSCULAR; INTRAVENOUS; SUBCUTANEOUS at 16:04

## 2018-01-01 RX ADMIN — HYDROMORPHONE HYDROCHLORIDE 1 MG: 1 INJECTION, SOLUTION INTRAMUSCULAR; INTRAVENOUS; SUBCUTANEOUS at 15:47

## 2018-01-01 RX ADMIN — PHENOBARBITAL SODIUM 30 MG: 65 INJECTION INTRAMUSCULAR; INTRAVENOUS at 14:13

## 2018-03-10 NOTE — CERTIFICATE OF TERMINAL ILLNESS
Hospice Physician RECERTIFICATION Narrative   (Certification of Terminal Illness)    [address] [facility]  Good Help to Those in Need  [phone]    Faiza Voss  1940    Principle Hospice Diagnosis: Dementia with Lewy body     HOSPICE RE-CERTIFICATION NARRATIVE COMPOSED BY PHYSICIAN     On recertification assessment the patient has further progressed in disease and has had a steady decline in overall functioning. Patient having increasing symptoms of restlessness, anxiety and agitation along with features of hallucinations and paranoid delusions. Patient often at times becomes very combative, often seen grabbing and attempting to choke facility staff members and considered at risk for harm to self and others. Patient has been started on antipsychotics including Haldol and seems to take care of the issues for the most part. Patient is total care assist for all ADLs including feeding. Patient has areas of some skin breakdown especially on the left inner gluteal region requiring dressing. Patient ambulates most of the day and is at high fall risk with history of numerous falls and requires close supervision and guidance. Objective changes in the patient's status since Hospice admission include worsening behavioral disturbances, declining function, PPS 30-40%. Based on this information, the patient continues to have a prognosis of less than 6 months and remains appropriate for Hospice services.     This Hospice Admission RE-CERTIFICATION Narrative (CTI) is composed by the physician and is based on:   [x] Review of Medical Record  [x] Interdisciplinary assessment on 2/28/2018  [] Face to Face visit by Provider (MD or NP) on   ______________________________________________________________________

## 2018-03-15 NOTE — PROGRESS NOTES
NAME OF PATIENT:  Ramos Rojas    LEVEL OF CARE:  Routine LOC      O2 SAFETY:  On R/A at present    FALL INTERVENTIONS PROVIDED:   Implemented/recommended resources for alarm system (personal alarm, bed alarm, call bell, etc.)  and Implemented/recommended increased supervision/assistance    INTERDISPLINARY COMMUNICATION/COLLABORATION:  Physician, MSW, [place] and RN, CNA    NEW MEDICATION INITIATION DOCUMENTATION:  Admit medications to be reviewed with Dr Simon Carbajal    Reason medication is being initiated:  N/A    MD / Provider name consulted re: change in status / initiation of new medication:  N/A    New Symptom(s):  Periods of agitation per wife    New Order(s):  Admit orders to be reviewed with wife    Name of the person notified of the changes:  Citlaly Salomon wife [phone]    Name of person being taught:  Wife    Instructions given:  Review goals of care fore comfort and wife agrees this is priority    Side Effects taught:  N/A    Response to teaching:  N/A      COMFORTABLE DYING MEASURE:  Is Patient/family satisfied with symptom level?  yes    DISCHARGE PLAN:  To be determined with SW  Can not go back to [facility] 316    2871  Arrived with [facility]. Client without signs of distress. Answers some questions but not appropriately. PA Completed  Wife arrived with DDNR and reviewed goals  Client does have upper and lower dentures. Very [place] at baseline. Son Thierry Savers lives in [place] and may call with questions/concerns we can give info out. She advises client frequently hallucinates. She is ok if we put in a urinary catheter for his comfort. 3772 Tato Roberto on R elbow for old skin tear. Sacral decub to be cleansed and dressing applied  1341  Medications ordered from [facility] in pharmacy to be delivered this evening  Client agitated in bed pulling at covers moving around and kicking legs about. PRN lorazepam given with scheduled morphine.   Bed alarm remains on  Sister and visitor present in room.  Will monitor for effect  1500 Agitated s/p dressing change to sacrum. Also placed condom cath. Moving about in bed as if may be in pain. PRN medications as per [facility]  Will monitor for effect  1635  Remains restless. Putting hand in depends. Appears calmer than earlier but still moving about in bed as if uncomfortable. Sister and friend remain in room. PRN medications to aid comfort  1711  Client resting with even unlabored respirations  Snoring. Family in room agree he looks comfortable. Ema Ca has arrived but family agrees he needs to rest at present  1812  Resting soundly snoring at present. Some slight moaning with turning and positioning but did not awaken enough to take scheduled medication.   Will continue to monitor

## 2018-03-15 NOTE — H&P
[facility] Help to Those in Need  [phone]    Patient Name: Claudene Radish  YOB: 1940    Date of Provider Hospice Visit: 03/15/18    Level of Care:   [] General Inpatient (GIP)    [x] Routine   [] Respite    Location of Care:  [] [facility] [] [facility] [] [facility] [] [facility] [x] Nabila Palacios [address] [place] [address]    Date of [facility] Admission: 12-5-18  Hospice Medical Director at time of admission: Dr Sheela Blair Diagnosis: Lewy Body Disease with dementia  Diagnoses RELATED to the terminal prognosis: atherosclerosis with CAD  Other Diagnoses: anxiety and depression     HOSPICE SUMMARY       Claudene Radish is a 69y.o. year old who was admitted to [facility]. The patient's principle diagnosis has resulted in decline in function, pt is unable to ambulate as he has progressive weakness and muscle wasting, poor PO intake and dehydration, hallucinations and paranoid delusions have been ongoing and recently have been worse, he is combative, he has a new sacral wound with drainage noted and is on an antibiotic, he is dependence for all ADls, he needs support when sitting up in a chair, he has progressive stiffness in his muscles and rigidity, new areas of skin breakdown, has fallen recently and is now no longer able to walk, worsening behavioral disturbances. Functionally, the patient's Karnofsky and/or Palliative Performance Scale has declined over a period of months  and is estimated at 30%. The patient is dependent on the following ADLs: he is dependent for all ALDS. Objective information that support this patients limited prognosis includes:   Pt with hx of Lewy Body disease      The patient/family chose comfort measures with the support of Hospice. HOSPICE DIAGNOSES   Active Symptoms:  1. Agitation  2. Weakness and fatigue  3. Anxiety  4. Hallucinations as described by spouse     PLAN   1. Pt is admitted routine level of care from [address] [place] [address]  2.  Comfort care and comfort medications  3. Treat behavioral disturbances and hallucinations with Seroquel and haldol  4. Treat anxiety with lorazepam  5. Continue Cipro for sacral wound infection  6.   and LCSW to support family needs  7. Met with spouse and had long discussion regarding goals of care and plan for comfort moving forward    8. Disposition:      Prognosis estimated based on 03/15/18 clinical assessment is:   [] Hours to Days    [x] Days to Weeks    [] Other:    Communicated plan of care with: Hospice Case Manager; Hospice IDT; Care Team     GOALS OF CARE     Resuscitation Status: DNR  Durable DNR: [x] Yes [] No    No flowsheet data found. HISTORY     History obtained from: chart, CM, spouse    CHIEF COMPLAINT:   The patient is:   [x] Verbal minimally verbal  [] Nonverbal  [] Unresponsive    HPI/SUBJECTIVE:  69year old male with Lewy Body disease with dementia, rigidity, progressive weakness and functional decline, poor po intake.        REVIEW OF SYSTEMS     The following systems were: [] reviewed  [x] unable to be reviewed    Positive ROS include:  Constitutional: fatigue, weakness, in pain, short of breath  Ears/nose/mouth/throat: increased airway secretions  Respiratory:shortness of breath, wheezing  Gastrointestinal:poor appetite, nausea, vomiting, abdominal pain, constipation, diarrhea  Musculoskeletal:pain, deformities, swelling legs  Neurologic:confusion, hallucinations, weakness  Psychiatric:anxiety, feeling depressed, poor sleep  Endocrine:     Adult Non-Verbal Pain Assessment Score: 5/10  Face  [] 0   No particular expression or smile  [x] 1   Occasional grimace, tearing, frowning, wrinkled forehead  [] 2   Frequent grimace, tearing, frowning, wrinkled forehead    Activity (movement)  [] 0   Lying quietly, normal position  [] 1   Seeking attention through movement or slow, cautious movement  [x] 2   Restless, excessive activity and/or withdrawal reflexes    Guarding  [] 0   Lying quietly, no positioning of hands over areas of body  [] 1   Splinting areas of the body, tense  [x] 2   Rigid, stiff    Physiology (vital signs)  [x] 0   Stable vital signs  [] 1   Change in any of the following: SBP > 20mm Hg; HR > 20/minute  [] 2   Change in any of the following: SBP > 30mm Hg; HR > 25/minute    Respiratory  [x] 0   Baseline RR/SpO2, compliant with ventilator  [] 1   RR > 10 above baseline, or 5% drop SpO2, mild asynchrony with ventilator  [] 2   RR > 20 above baseline, or 10% drop SpO2, asynchrony with ventilator     FUNCTIONAL ASSESSMENT     Palliative Performance Scale (PPS): 30%     PSYCHOSOCIAL/SPIRITUAL ASSESSMENT     Active Problems:    * No active hospital problems. *    No past medical history on file. No past surgical history on file. Social History   Substance Use Topics    Smoking status: Not on file    Smokeless tobacco: Not on file    Alcohol use Not on file     No family history on file.    Allergies   Allergen Reactions    Cephalexin Hives    Levofloxacin Unknown (comments)    Penicillin Unknown (comments)    Pravastatin Unknown (comments)      Current Facility-Administered Medications   Medication Dose Route Frequency    bisacodyl (DULCOLAX) suppository 10 mg  10 mg Rectal DAILY PRN    acetaminophen (TYLENOL) tablet 650 mg  650 mg Oral Q4H PRN    acetaminophen (TYLENOL) suppository 650 mg  650 mg Rectal Q4H PRN    busPIRone (BUSPAR) tablet 10 mg  10 mg Oral TID    ciprofloxacin HCl (CIPRO) tablet 500 mg (Patient Supplied)  500 mg Oral BID    LORazepam (INTENSOL) 2 mg/mL oral concentrate 0.5 mg  0.5 mg SubLINGual Q1H PRN    morphine (ROXANOL) 100 mg/5 mL (20 mg/mL) concentrated solution 5 mg  5 mg SubLINGual Q1H PRN    morphine (ROXANOL) 100 mg/5 mL (20 mg/mL) concentrated solution 5 mg  5 mg SubLINGual Q8H    docusate sodium (COLACE) capsule 100 mg  100 mg Oral BID    [START ON 3/16/2018] dutasteride (AVODART) capsule 0.5 mg  0.5 mg Oral DAILY    gabapentin (NEURONTIN) capsule 200 mg  200 mg Oral TID    ibuprofen (MOTRIN) tablet 200 mg  200 mg Oral Q4H PRN    QUEtiapine (SEROquel) tablet 75 mg  75 mg Oral TID    [START ON 3/16/2018] sertraline (ZOLOFT) tablet 150 mg  150 mg Oral DAILY    [START ON 3/16/2018] tamsulosin (FLOMAX) capsule 0.4 mg  0.4 mg Oral DAILY    haloperidol (HALDOL) tablet 0.5 mg  0.5 mg Oral Q4H PRN        PHYSICAL EXAM     Wt Readings from Last 3 Encounters:   12/05/17 80.7 kg (178 lb)       Visit Vitals    /54 (BP 1 Location: Left arm, BP Patient Position: At rest)    Pulse 78    Temp 98.9 °F (37.2 °C)    Resp 12    SpO2 97%       Supplemental O2  [] Yes  [x] NO  Last bowel movement:     Currently this patient has:  [] Peripheral IV [] PICC  [] PORT [] ICD    [] Solis Catheter [] NG Tube   [] PEG Tube    [] Rectal Tube [] Drain  [] Other:   Constitutional: pt is awake but cannot engage in conversation,  Eyes: pupils equal, anicteric  ENMT: no nasal discharge, moist mucous membranes  Cardiovascular: regular rhythm, distal pulses intact  Respiratory: breathing not labored, symmetric  Gastrointestinal: soft non-tender, +bowel sounds  Musculoskeletal: no deformity, no tenderness to palpation  Skin: warm, dry  Neurologic:pt is  not able to follow commands, pt is not moving all extremities  Psychiatric:   hallucinations, confusion      Pertinent Lab and or Imaging Tests:  No results found for: NA, K, CL, CO2, AGAP, GLU, BUN, CREA, BUCR, GFRAA, GFRNA, CA, GFRAA  No results found for: TP, ALBR, TALB, ALB        Total time: 55  Counseling / coordination time: 45  > 50% counseling / coordination?: jese Duarte NP

## 2018-03-15 NOTE — PROGRESS NOTES
Problem: Pressure Injury - Risk of  Goal: *Prevention of pressure ulcer  Outcome: Progressing Towards Goal  Sacral decub present. Wound care as ordered. Turning and positioning for comfort and to prevent further breakdown. Problem: Falls - Risk of  Goal: *Absence of Falls  Document Naida Fall Risk and appropriate interventions in the flowsheet. Outcome: Progressing Towards Goal  Fall Risk Interventions:       Mentation Interventions: Bed/chair exit alarm    Medication Interventions: Bed/chair exit alarm    Elimination Interventions: Bed/chair exit alarm, Call light in reach             Problem: Comfort Deficit  Goal: Reduce/control pain  Patient will report that pain has been reduced or controlled through verbal and nonverbal means and that measures to promote comfort are effective.   Outcome: Progressing Towards Goal  Medication to aid comfort and agitation scheduled and PRN  Trying to keep environment calm

## 2018-03-15 NOTE — PROGRESS NOTES
15: 42:New admission with a DX of Lior Body Dementia adm schedule meds, Seroquel and gabapentin tolerated well.

## 2018-03-16 NOTE — PROGRESS NOTES
Problem: Pressure Injury - Risk of  Goal: *Prevention of pressure ulcer  Outcome: Progressing Towards Goal  Assessment of skin at beginning of shift and throughout. Patient has pressure ulcer on his right buttocks (wound care). Turning/repositioning along with barrier cream being used to prevent further skin breakdown. Will continue to monitor.

## 2018-03-16 NOTE — PROGRESS NOTES
0700:  Verbal shift change report given to Carlos Mireles RN (oncoming nurse) by Radha Grissom RN (offgoing nurse). Report included the following information SBAR, Kardex, Intake/Output and MAR.   0953: Went in room to administer scheduled medications; pt sleeping (snoring). Assessed LS (clear) and pulses (palpable). Called to patient by name; pt did not wake up. Will attempt to administer scheduled medications later. 1105:  Rounded; pt's wife present. Gave update on pt's condition and that he was not able to take his morning medications. Wife is okay with this and just wants him to be comfortable; pt sleeping; no s/s of distress. 1115:  Patient agitated (pulling on blanket/pulled condom cath off) and in pain (grimacing/moaning). Administered prn roxanol 5mg/SL & lorazepam 0.5mg/SL. Discussed placement of [place]inas catheter with wife and she agreed. 1200:  Reassessed pain & agitation; no s/s of pain nor agitation. Assessed pt's ability to swallow; gave pt pudding which he was able to swallow but coughed a little. Spoke with wife about changing pt's diet to pureed; wife agreed. Administered scheduled medications from this morning (crushed and placed in nectar thickened liquid). 1230:  Listened to LS; heard coarseness possibly d/t aspiration of thickened liquids. Dr. Becky Klein and Yudith Nunn present in room with wife. Dr. Becky Klein explained able risk of aspiration when swallowing crushed pills. Dr. Becky Klein suggested changing patient's medications to liquids. Wife agreed. Wife is very thankful that her  is here. Prayer was given by . 1450:  Administered scheduled roxanol 5mg/SL; no s/s of distress; pt resting comfortably. 1510:  Placed salinas catheter and repositioned. Wound dressing came off of patient; replaced with foam dressing. Patient grimacing; administered prn roxanol 5mg/SL.     1600:  Reassess pain; pt resting comfortably with no s/s of distress; family present. [phone]:  Family called stating pt is having pain; came to room; pt grimacing with arm movements. Administered prn roxanol 5mg/SL. NAME OF PATIENT:  Becca Lea    LEVEL OF CARE:  Routine    REASON FOR GIP:   n/a    *PATIENT REMAINS ELIGIBLE FOR GIP LEVEL OF CARE AS EVIDENCED BY: (MUST BE ADDRESSED OF PATIENT GIP)      REASON FOR RESPITE:  n/a    O2 SAFETY:  n/a    FALL INTERVENTIONS PROVIDED:   Implemented/recommended resources for alarm system (personal alarm, bed alarm, call bell, etc.) , Implemented/recommended environmental changes (remove hazards, lower bed, improve lighting, etc.) and Implemented/recommended increased supervision/assistance    INTERDISPLINARY COMMUNICATION/COLLABORATION:  Physician, MSW, [place] and RN, CNA    NEW MEDICATION INITIATION DOCUMENTATION:  n/a    Reason medication is being initiated:  n/a    MD / Provider name consulted re: change in status / initiation of new medication:  n/a    New Symptom(s):  n/a    New Order(s):  n/a    Name of the person notified of the changes:  n/a    Name of person being taught:  n/a    Instructions given:  n/a    Side Effects taught:  n/a    Response to teaching:  n/a      COMFORTABLE DYING MEASURE:  Is Patient/family satisfied with symptom level?  yes    DISCHARGE PLAN:  MSW will be working with family on proper placement for discharge (LTC or home).

## 2018-03-16 NOTE — PROGRESS NOTES
Problem: Comfort Deficit  Goal: Reduce/control pain  Patient will report that pain has been reduced or controlled through verbal and nonverbal means and that measures to promote comfort are effective. Outcome: Progressing Towards Goal  Assessment of pain at beginning of shift and throughout. Patient requires wound care which requires pre-medication for anticipated pain; this has been very effective. Will continue to monitor.

## 2018-03-16 NOTE — PROGRESS NOTES
Joss Amaro 262 Requested Visit. I was informed by staff that a visitor of this patient who had recently been admitted to the [facility] from [facility] for symptom control had requested I stop in to see her and the family of the patient. Staff indicated the visitor knew me from my assistance with her father when he was a  Hospice Patient in 2012. The current patient has been visited on a regular basis by [employer] since his admission to [facility]. I entered the room and met the daughter of the 2012 [facility] Hospice patient for whom I cared and remembered her and her father. She introduced me to her \"best friend and the sister\" of the patient. She was present today to support her friend and the patient while his wife took a break. The three of us spoke about my prior relationship and then I moved the discussion to that of Mr. Robyn White and his current situation. They indicated they were both very pleased with the [facility] and said the patient was much more comfortable and less agitated than when he arrived. He would occasionally open his eyes but was not responsive. However, he became slightly more agitated as my visit continued so that when I left, I spoke to [employer] and she said she would provide additional PRN Medication. During my visit, the three of us discussed the patient's history, his [place] but according to his sister, neither he nor his wife, Zeny Lopez, currently are active in a specific Restorationism -  while his sister displays a strong Taoist shruthi. I provided a listening presence, active listening, words of comfort, assurance, spiritual and emotional support. They asked that I visit tomorrow when the wife of the patient was present and I said I would. I also said I would coordinate with Chaplain Nunn on Spiritual Support for the patient and his family.     GOALS:  As needed or requested, provide Supplemental Spiritual Support and Pastoral Care to that being provided by [facility]. Visit to assist the family of this patient with their anxiety and coping with regards his condition, his medical decline, and his prognosis. PLAN:  Coordinate with Chaplain Nunn regarding the spiritual care of this patient and his family. Visit as requested by family or staff.    Visit Frequency:  1 wk 1 plus 4 PRN 21 days starting 3/15/18    Jonathan Scherer MTS, [facility] Chaplain  [phone]

## 2018-03-16 NOTE — ROUTINE PROCESS
Verbal shift change report given to Belinda Crockett RN by Urszula Leyva RN. Report included the following information SBAR, Kardex, Intake/Output and MAR.      NAME OF PATIENT:  Reyes Bashir    LEVEL OF CARE:  Routine    REASON FOR GIP:  Patient not in GIP care at this time. REASON FOR RESPITE: Patient not in Respite care at this time. O2 SAFETY: Patient is on room air at this time. FALL INTERVENTIONS PROVIDED:   Implemented/recommended use of non-skid footwear, Implemented/recommended use of fall risk identification flag to all team members, Implemented/recommended assistive devices and encouraged their use, Implemented/recommended resources for alarm system (personal alarm, bed alarm, call bell, etc.) , Implemented/recommended environmental changes (remove hazards, lower bed, improve lighting, etc.) and Implemented/recommended increased supervision/assistance    INTERDISPLINARY COMMUNICATION/COLLABORATION:  Physician, MSW, [place] and RN, CNA    NEW MEDICATION INITIATION DOCUMENTATION:  No new medications nor interventions were begun on this night shift. Reason medication is being initiated:  N/A    MD / Provider name consulted re: change in status / initiation of new medication:  N/A    New Symptom(s):  N/A    New Order(s):  N/A    Name of the person notified of the changes:  N/A    Name of person being taught:  N/A    Instructions given:  N/A    Side Effects taught:  N/A    Response to teaching:  N/A    COMFORTABLE DYING MEASURE:  Monitor for pain, dyspnea, agitation, and restlessness and intervene accordingly. Is Patient/family satisfied with symptom level?  yes    DISCHARGE PLAN:  Care management to work on discharge planning since patient cannot return to [facility] due to inability to function independently.

## 2018-03-16 NOTE — PROGRESS NOTES
0600 Complete bed bath given. Repositioned to left side. Right hip dressing saturated with purulent yellow drainage and drainage oozing from 2cm wound. Wound tunnels 1.5cm. Area cleansed and packed with guaze, bordered dressing applied.

## 2018-03-16 NOTE — PROGRESS NOTES
[facility]   Good Help to Those in Need  [phone]    Patient Name: Zafar Jang  YOB: 1940    Date of Provider Hospice Visit: 03/16/18    Level of Care:   [] General Inpatient (GIP)    [x] Routine   [] Respite    Location of Care:  [] [facility] [place] [] [facility] [] [facility] [] [facility] [x] [facility]    Principle Hospice Diagnosis: Lewy Body Disease with dementia  Diagnoses RELATED to the terminal prognosis: atherosclerosis with CAD  Other Diagnoses: anxiety and depression        HOSPICE DIAGNOSES   Active Symptoms: 1. Lethargy/fatigue/weakness  2. Restlessness/agitation: Intermittent  3. Generalized pain: Nonverbal  4. Swallowing dysfunction/aspiration risk/inability to eat  5. Declining function     PLAN   1. Continue routine hospice care  2. Had long conversation with patient's wife Zeny Lopez at the bedside discussing patient's current medical condition, symptoms, prognosis and a steady decline even since yesterday. Patient has become more lethargic, minimally responsive, having intermittent periods of anxiety and restlessness. Patient unable to eat or drink and aspirated when he was given medication mixed with puréed food. Wife is agreeable to keep patient comfortable and avoid interventions that will increased discomfort or do more harm. 3. Recommend after discussion with wife to stop oral medications that are difficult to administer now. 4. Continue with comfort medications and use liquid formulations. 5. Roxanol 5 mg scheduled every 8 hours and 5 mg every hour as needed for pain. 6. Haldol 2 mg solution every 4 hours as needed for extreme agitation or restlessness along with Lorazepam 0.5 mg p.o. every hour as needed. 7. Continue feeding puréed foods and small quantities and thickened liquids as tolerated for comfort using aspiration precautions. 8.  and SW to support family needs  9.  Disposition: If patient survives routine care stay for 2-3 weeks and wife will consider moving him to the assisted living facility/nursing home    Prognosis estimated based on 03/16/18 clinical assessment is:   [] Hours to Days    [x] Days to Weeks    [] Other:    Communicated plan of care with: Hospice Case Manager; Hospice IDT; Care Team     GOALS OF CARE     Resuscitation Status: DNR  Durable DNR: [x] Yes [] No    No flowsheet data found. HISTORY     History obtained from: chart, wife, hospice staff    CHIEF COMPLAINT: Not applicable  The patient is:   [] Verbal  [x] Nonverbal  [x] Unresponsive    HPI/SUBJECTIVE: Patient seen lethargic and almost unresponsive. Wife at bedside. Patient had episode of aspiration and choking this morning when he was being given morning medications mixed in puréed food.        REVIEW OF SYSTEMS     The following systems were: [] reviewed  [x] unable to be reviewed      Adult Non-Verbal Pain Assessment Score: 3    Face  [] 0   No particular expression or smile  [x] 1   Occasional grimace, tearing, frowning, wrinkled forehead  [] 2   Frequent grimace, tearing, frowning, wrinkled forehead    Activity (movement)  [] 0   Lying quietly, normal position  [x] 1   Seeking attention through movement or slow, cautious movement  [] 2   Restless, excessive activity and/or withdrawal reflexes    Guarding  [x] 0   Lying quietly, no positioning of hands over areas of body  [] 1   Splinting areas of the body, tense  [] 2   Rigid, stiff    Physiology (vital signs)  [] 0   Stable vital signs  [x] 1   Change in any of the following: SBP > 20mm Hg; HR > 20/minute  [] 2   Change in any of the following: SBP > 30mm Hg; HR > 25/minute    Respiratory  [x] 0   Baseline RR/SpO2, compliant with ventilator  [] 1   RR > 10 above baseline, or 5% drop SpO2, mild asynchrony with ventilator  [] 2   RR > 20 above baseline, or 10% drop SpO2, asynchrony with ventilator     FUNCTIONAL ASSESSMENT     Palliative Performance Scale (PPS): 30%     PSYCHOSOCIAL/SPIRITUAL ASSESSMENT     Active Problems:    * No active hospital problems. *    No past medical history on file. No past surgical history on file. Social History   Substance Use Topics    Smoking status: Not on file    Smokeless tobacco: Not on file    Alcohol use Not on file     No family history on file.    Allergies   Allergen Reactions    Cephalexin Hives    Levofloxacin Unknown (comments)    Penicillin Unknown (comments)    Pravastatin Unknown (comments)      Current Facility-Administered Medications   Medication Dose Route Frequency    haloperidol (HALDOL) 2 mg/mL oral solution 2 mg  2 mg Oral Q4H PRN    bisacodyl (DULCOLAX) suppository 10 mg  10 mg Rectal DAILY PRN    acetaminophen (TYLENOL) suppository 650 mg  650 mg Rectal Q4H PRN    LORazepam (INTENSOL) 2 mg/mL oral concentrate 0.5 mg  0.5 mg SubLINGual Q1H PRN    morphine (ROXANOL) 100 mg/5 mL (20 mg/mL) concentrated solution 5 mg  5 mg SubLINGual Q1H PRN    morphine (ROXANOL) 100 mg/5 mL (20 mg/mL) concentrated solution 5 mg  5 mg SubLINGual Q8H        PHYSICAL EXAM     Wt Readings from Last 3 Encounters:   12/05/17 80.7 kg (178 lb)       Visit Vitals    /54 (BP 1 Location: Left arm, BP Patient Position: At rest)    Pulse 84    Temp 99.4 °F (37.4 °C)    Resp 16    SpO2 95%       Supplemental O2  [] Yes  [x] NO  Last bowel movement:     Currently this patient has:  [] Peripheral IV [] PICC  [] PORT [] ICD    [] Solis Catheter [] NG Tube   [] PEG Tube    [] Rectal Tube [] Drain  [] Other:     Constitutional: Lethargic, eyes closed, not engaging in any conversation, appears to be comfortable  Eyes: Closed  ENMT: Dry mucous membranes  Cardiovascular: Distant heart sounds  Respiratory: Nonlabored breathing  Gastrointestinal: Soft, nontender, bowel sounds present  Musculoskeletal: No contractures or deformities and no tenderness to palpation  Skin: No rashes or bruises, no edema, patient has a stage III sacral decub that is tender to palpation and has a necrotic exudate and slough attached to the base of the ulcer, no drainage and no odor.   Neurologic: Lethargic, minimally responsive, moving all extremities and having intermittent restlessness  Psychiatric: N/A  Other:       Pertinent Lab and or Imaging Tests:  No results found for: NA, K, CL, CO2, AGAP, GLU, BUN, CREA, BUCR, GFRAA, GFRNA, CA, GFRAA  No results found for: TP, ALBR, TALB, ALB        Total time: 35 minutes  Counseling / coordination time: 20 minutes spent discussing with wife and hospice staff  > 50% counseling / coordination?:  Yes

## 2018-03-17 NOTE — PROGRESS NOTES
Problem: Falls - Risk of  Goal: *Absence of Falls  Document Naida Fall Risk and appropriate interventions in the flowsheet.    Outcome: Progressing Towards Goal  Fall Risk Interventions:       Mentation Interventions: Adequate sleep, hydration, pain control, Bed/chair exit alarm, Door open when patient unattended, Evaluate medications/consider consulting pharmacy, More frequent rounding, Room close to nurse's station    Medication Interventions: Bed/chair exit alarm, Evaluate medications/consider consulting pharmacy    Elimination Interventions: Bed/chair exit alarm, Call light in reach    History of Falls Interventions: Bed/chair exit alarm, Door open when patient unattended, Evaluate medications/consider consulting pharmacy, Room close to nurse's station

## 2018-03-17 NOTE — PROGRESS NOTES
Verbal shift change report given to Anshu Weeks (oncoming nurse) by Suzanne Garvey (offgoing nurse). Report included the following information SBAR and Kardex. NAME OF PATIENT:  Shay Aragon    LEVEL OF CARE:  Routine beginning 3/15/2018. REASON FOR GIP:   Not GIP at this time. *PATIENT REMAINS ELIGIBLE FOR GIP LEVEL OF CARE AS EVIDENCED BY: (MUST BE ADDRESSED OF PATIENT GIP)    REASON FOR RESPITE:  Not respite at this time. O2 SAFETY:  Not using oxygen at this time. FALL INTERVENTIONS PROVIDED:   Implemented/recommended use of fall risk identification flag to all team members, Implemented/recommended assistive devices and encouraged their use, Implemented/recommended resources for alarm system (personal alarm, bed alarm, call bell, etc.) , Implemented/recommended environmental changes (remove hazards, lower bed, improve lighting, etc.) and Implemented/recommended increased supervision/assistance    INTERDISPLINARY COMMUNICATION/COLLABORATION:  Physician, MSW, [place] and RN, CNA    NEW MEDICATION INITIATION DOCUMENTATION:  No new medications at this time. Reason medication is being initiated:  n/a    MD / Provider name consulted re: change in status / initiation of new medication:  n/a    New Symptom(s):  No new symptoms at this time. Active symptoms being treated are anxiety, agitation, and pain. New Order(s):  No new orders at this time. Name of the person notified of the changes:  n/a    Name of person being taught:  n/a    Instructions given:  n/a    Side Effects taught:  n/a    Response to teaching:  n/a    COMFORTABLE DYING MEASURE:  Is Patient/family satisfied with symptom level?  yes    DISCHARGE PLAN:  Possible placement in a long term care facility. If patient continues to decline, he might stay at [facility] for end of life care. Night shift summary:  1900 - Report received. Bed alarm is in use at all times due to the patient's fall history. 1935 - Turned to right side.  Wife Libby Frausto is at bedside. Bowel sounds are active. [place] is patent and draining clear yellow urine. PRN Roxanol  5 mg sublingual given for increasing grimacing when moving or being turned. Mostly lethargic. Non-verbal. Lungs are clear. Upper and lower dentures are in a cup beside the sink in the patient's room. 2040 - PRN Roxanol 5 mg sublingual and Ativan 0.5 mg po given for continued grimacing and increasing restlessness and agitation. Patient is moving his arms around in jerky movements. 96% on room air. 2100 - Shave of patient's face done. 2129 - PRN Roxanol 5 mg sublingual given for increasing amount of grimacing and restlessness. PRN Haldol 2 mg po given for increasing restlessness and agitation. Patient turned to left side. 2219 - Wife Libby Frausto left [facility] for the night. Patient continues to be restless, with jerky arm movements. Scheduled Roxanol 5 mg po and PRN Ativan 0.5 mg po given. 2352 - Restless, grimacing, jerky arm movements. PRN Roxanol 5 mg po and PRN Ativan 0.5 mg po given. Duoderm applied to left buttock wound (with large amount of purulent drainage). Allevan foam dressing applied to right buttock skin tear. 0000 - Turned to right side. Resting. No agitation at this time. Breathing is even and unlabored. 0300 - Turned to left side. 0600 - Turned to right side. Left buttock wound dressing remains dry and intact at this time. Tolerating po medications; no coughing, no choking. Total urine output from the [place] tonight = 375 ml. Resting.

## 2018-03-17 NOTE — PROGRESS NOTES
0700:  Verbal shift change report given to Zay Faith RN (oncoming nurse) by Bryan Martinez RN (offgoing nurse). Report included the following information SBAR, Kardex, Intake/Output and MAR.   0800:  Assessed pt: unresponsive (eyes closed/no verbal), LS coarse, pulses palpable, VS WNL except RR 4/apneic, skin warm to touch, BS active. Patient grimacing with body movement in all 4 extremities with twitching. Will receive new orders to help with secretions. 65:  Spoke with Kylie Ng NP. New orders approved (scopolamine & atropine). 0820:  Administered prn roxanol 5mg/SL, haldol 2mg/SL and scopolamine patch behind left ear for pain/agitation/secretions. Will continue to monitor. 0900:  Reassessed pain/agitation/secretions: no s/s of pain nor agitation; LS still coarse but no s/s of respiratory distress (O2 @ 97%/RA with no labored breathing). 1000:  Rounded; no s/s of distress; pt resting comfortably. 1100:  Spoke with Kylie Ng NP for scheduled order of haldol; order approved. 1115:  Patient's wife arrived. Update wife on patient's condition and new orders. Educated wife on the side effects of new medications; wife verbalized understanding. 1200:  Administered scheduled medications; no s/s of distress; family present. 1320: Wife came out of room stating that patient is trying to get out of bed. Entered room; pt grimacing moving arms & legs. Administered prn roxanol 5mg/SL, haldol 2mg/SL, and lorazepam 0.5mg/SL. This nurse believes that patient should be changed to GIP LOC d/t need for frequent medication changes along with frequent prn doses for agitation and pain. Will contact Kylie Ng NP for new orders. 1330:  Spoke with Kylie Ng NP. New order for morphine 2mg/IV prn q1hr. She wants to evaluate the pt tomorrow before decided on GIP.  1400:  Updated pt's wife on changes. Placed IV in pt; no difficulties.  Reassessed pain & agitation; no s/s of pain nor agitation. 1530:  Rounded; no s/s of distress; family present. 1705:  Administered scheduled medications; no s/s of distress; family present. NAME OF PATIENT:  Milli Shaffer    LEVEL OF CARE:  Routine    REASON FOR GIP:   n/a    *PATIENT REMAINS ELIGIBLE FOR GIP LEVEL OF CARE AS EVIDENCED BY: (MUST BE ADDRESSED OF PATIENT GIP)      REASON FOR RESPITE:  n/a    O2 SAFETY:  n/a    FALL INTERVENTIONS PROVIDED:   Implemented/recommended resources for alarm system (personal alarm, bed alarm, call bell, etc.) , Implemented/recommended environmental changes (remove hazards, lower bed, improve lighting, etc.) and Implemented/recommended increased supervision/assistance    INTERDISPLINARY COMMUNICATION/COLLABORATION:  Physician, MSW, [place] and RN, CNA    NEW MEDICATION INITIATION DOCUMENTATION:  n/a    Reason medication is being initiated:  n/a    MD / Provider name consulted re: change in status / initiation of new medication:  n/a    New Symptom(s):  n/a    New Order(s):  n/a    Name of the person notified of the changes:  n/a    Name of person being taught:  n/a    Instructions given:  n/a    Side Effects taught:  n/a    Response to teaching:  n/a      COMFORTABLE DYING MEASURE:  Is Patient/family satisfied with symptom level?  yes    DISCHARGE PLAN:  MSW is working with family on discharge planning or patient could possibly  here at [facility].

## 2018-03-17 NOTE — PROGRESS NOTES
Problem: Pain  Goal: *Control of Pain  Outcome: Progressing Towards Goal  Pt requiring scheduled & prn pain medications with frequent assessments/reassessments. New orders placed. Pt could possibly change to GIP level of care. Will continue to monitor.

## 2018-03-18 NOTE — PROGRESS NOTES
[facility]   Good Help to Those in Need  [phone]    Patient Name: Drew Gama  YOB: 1940    Date of Provider Hospice Visit: 03/18/18    Level of Care:   [x] General Inpatient (GIP)    [] Routine   [] Respite    Location of Care:  [] [facility] PSYCHIATR[facility] [place] [] [facility] [] [address] [] [facility] [x] [facility]    Principle Hospice Diagnosis: Lewy Body Disease with dementia  Diagnoses RELATED to the terminal prognosis: atherosclerosis with CAD  Other Diagnoses: anxiety and depression        HOSPICE DIAGNOSES   Active Symptoms: 1. Lethargy/fatigue/weakness  2. Restlessness/agitation: Intermittent  3. Generalized pain: Nonverbal  4. Swallowing dysfunction/aspiration risk/inability to eat  5. Declining function     PLAN   1. Change status to GIP; requiring frequent adjustments and administration of medications in attempts to keep symptoms controlled. 2. Difficulty controlling agitation and pain. 3. Discontinue Ativan; this seems to worsen agitation. 4. Discontinue SL haldol and morphine as pt is choking on this. 5. Haldol 5mg x 1 dose for agitation. 6. Haldol 2.5mg IV q. 2 hours prn.  7. Morphine 2mg IV q. 2 hours prn.   8.  and SW to support family needs  9. Disposition: If patient survives routine care stay for 2-3 weeks and wife will consider moving him to the assisted living facility/nursing home    Prognosis estimated based on 03/18/18 clinical assessment is:   [x] Hours to Days    [] Days to Weeks    [] Other:    Communicated plan of care with: Hospice Case Manager; Hospice IDT; Care Team     GOALS OF CARE     Resuscitation Status: DNR  Durable DNR: [x] Yes [] No    No flowsheet data found. HISTORY     History obtained from: chart, wife, hospice staff    CHIEF COMPLAINT: Not applicable  The patient is:   [] Verbal  [x] Nonverbal  [x] Unresponsive    HPI/SUBJECTIVE: Patient seen lethargic and almost unresponsive. Wife at bedside.   Patient had episode of aspiration and choking this morning when he was being given morning medications mixed in puréed food. REVIEW OF SYSTEMS     The following systems were: [] reviewed  [x] unable to be reviewed      Adult Non-Verbal Pain Assessment Score: 3    Face  [] 0   No particular expression or smile  [x] 1   Occasional grimace, tearing, frowning, wrinkled forehead  [] 2   Frequent grimace, tearing, frowning, wrinkled forehead    Activity (movement)  [] 0   Lying quietly, normal position  [x] 1   Seeking attention through movement or slow, cautious movement  [] 2   Restless, excessive activity and/or withdrawal reflexes    Guarding  [x] 0   Lying quietly, no positioning of hands over areas of body  [] 1   Splinting areas of the body, tense  [] 2   Rigid, stiff    Physiology (vital signs)  [] 0   Stable vital signs  [x] 1   Change in any of the following: SBP > 20mm Hg; HR > 20/minute  [] 2   Change in any of the following: SBP > 30mm Hg; HR > 25/minute    Respiratory  [x] 0   Baseline RR/SpO2, compliant with ventilator  [] 1   RR > 10 above baseline, or 5% drop SpO2, mild asynchrony with ventilator  [] 2   RR > 20 above baseline, or 10% drop SpO2, asynchrony with ventilator     FUNCTIONAL ASSESSMENT     Palliative Performance Scale (PPS): 30%     PSYCHOSOCIAL/SPIRITUAL ASSESSMENT     Active Problems:    * No active hospital problems. *    No past medical history on file. No past surgical history on file. Social History   Substance Use Topics    Smoking status: Not on file    Smokeless tobacco: Not on file    Alcohol use Not on file     No family history on file.    Allergies   Allergen Reactions    Cephalexin Hives    Levofloxacin Unknown (comments)    Penicillin Unknown (comments)    Pravastatin Unknown (comments)      Current Facility-Administered Medications   Medication Dose Route Frequency    haloperidol lactate (HALDOL) injection 2.5 mg  2.5 mg IntraVENous Q2H PRN    scopolamine (TRANSDERM-SCOP) 1 mg over 3 days 1 Patch  1 Patch TransDERmal Q72H    atropine 1 % ophthalmic solution 2 Drop  2 Drop SubLINGual Q2H PRN    morphine (ROXANOL) 100 mg/5 mL (20 mg/mL) concentrated solution 5 mg  5 mg SubLINGual Q4H    haloperidol (HALDOL) 2 mg/mL oral solution 1 mg  1 mg Oral Q6H    bisacodyl (DULCOLAX) suppository 10 mg  10 mg Rectal DAILY PRN    acetaminophen (TYLENOL) suppository 650 mg  650 mg Rectal Q4H PRN        PHYSICAL EXAM     Wt Readings from Last 3 Encounters:   12/05/17 80.7 kg (178 lb)       Visit Vitals    BP 92/50 (BP 1 Location: Left arm, BP Patient Position: At rest;Supine)    Pulse 83    Temp 99.2 °F (37.3 °C)    Resp (!) 7    SpO2 96%       Supplemental O2  [] Yes  [x] NO  Last bowel movement:     Currently this patient has:  [x] Peripheral IV [] PICC  [] PORT [] ICD    [] Solis Catheter [] NG Tube   [] PEG Tube    [] Rectal Tube [] Drain  [] Other:     Constitutional: obtunded  Eyes: Closed  ENMT: Dry mucous membranes  Cardiovascular: Distant heart sounds  Respiratory: Nonlabored breathing, Cheyne Padgett   Gastrointestinal: Soft, nontender, bowel sounds present  Musculoskeletal: No contractures or deformities and no tenderness to palpation  Skin: No rashes or bruises, no edema, patient has a stage III sacral decub that is tender to palpation and has a necrotic exudate and slough attached to the base of the ulcer, no drainage and no odor.   Neurologic: Lethargic, minimally responsive, moving all extremities and having intermittent restlessness  Psychiatric: N/A  Other:       Pertinent Lab and or Imaging Tests:  No results found for: NA, K, CL, CO2, AGAP, GLU, BUN, CREA, BUCR, GFRAA, GFRNA, CA, GFRAA  No results found for: TP, ALBR, TALB, ALB        Total time: 35 minutes  Counseling / coordination time: 20 minutes spent discussing with wife and hospice staff  > 50% counseling / coordination?:  Yes

## 2018-03-18 NOTE — PROGRESS NOTES
Problem: Pain  Goal: *Control of Pain  Outcome: Progressing Towards Goal  Pt changed to GIP level of care d/t pain & agitation. Pt requiring frequent assessments along with prn pain medication. Will continue to monitor.

## 2018-03-18 NOTE — PROGRESS NOTES
0700:  Verbal shift change report given to Surjit Berkowitz RN (oncoming nurse) by Sarah Gregory RN (offgoing nurse). Report included the following information SBAR, Kardex, Intake/Output and MAR.   0800:  Assessed pt and administered scheduled medications; pt responds to pain, LS clear/diminished, skin warm to touch, radial & pedal pulses palpable, no s/s of distress. 0930:  Rounded; pt grimacing/trying to sit up. Administered prn morphine 2mg/IV.  0945:  Rounded; pt moving extremities/pulling on linens (extreme agitation). Administered prn haldol 2mg/SL & lorazepam 0.5mg/SL. 1045:  Reassessed pain & agitation; pt resting comfortably; no s/s of pain nor agitation. 1125:  Came into room; pt grimacing/moaning (pain) and moving extremities/pulling on linens (extreme agitation). Administered prn morphine 2mg/IV & lorazepam 0.5mg/SL. 1205:  Administered scheduled haldol 1mg/SL and roxanol 5mg/SL. Pt showing no s/s of pain nor agitation. 1240:  Rounded; pt grimacing/moaning with movement of extremities. Administered prn morphine 2mg/IV and lorazepam 0.5mg/SL. Will contact Darius Gooden NP, for new orders. 1250:  Spoke with Darius Gooden NP. Received order for a one-time dose of haldol 5mg/IV for agitation. 1315: Wife arrived; gave update on pt's condition. Pt is comfortable at this time. 1355:  Family member came to nurses' station asking for help with pt. Went into room and pt was grimacing/splinting/moving extremities (pain/agitation); wife at bedside holding pt's hand. Administered one-time dose of haldol 5mg/IV and morphine 2mg/IV. 1450:  Reassessed pain/agitation; no s/s of pain nor agitation; pt resting comfortably; family present. 1520:  Darius Gooden NP, came to assess patient and has changed pt from Routine to GIP LOC. 1710:  Repositioned pt in bed. Pt became agitated (movement of arms/twitching) and grimacing. Administered prn morphine 2mg/IV and haldol 2.5mg/IV.   1830: Turned/repositioned pt on to right side. Patient grimacing (pain); administered prn morphine 2mg/IV. Emptied salinas; 350 mLs. NAME OF PATIENT:  Paddy Frausto    LEVEL OF CARE:  [facility]    REASON FOR GIP: Unmanaged pain and agitation  n/a    *PATIENT REMAINS ELIGIBLE FOR [facility] LEVEL OF CARE AS EVIDENCED BY: (MUST BE ADDRESSED OF PATIENT GIP) Change status to [facility]; requiring frequent adjustments and administration of medications in attempts to keep symptoms controlled. REASON FOR RESPITE:  n/a    O2 SAFETY:  n/a    FALL INTERVENTIONS PROVIDED:   Implemented/recommended resources for alarm system (personal alarm, bed alarm, call bell, etc.) , Implemented/recommended environmental changes (remove hazards, lower bed, improve lighting, etc.) and Implemented/recommended increased supervision/assistance    INTERDISPLINARY COMMUNICATION/COLLABORATION:  Physician, MSW, [place] and RN, CNA    NEW MEDICATION INITIATION DOCUMENTATION:  n/a    Reason medication is being initiated:  n/a    MD / Provider name consulted re: change in status / initiation of new medication:  n/a    New Symptom(s):  n/a    New Order(s):  n/a    Name of the person notified of the changes:  n/a    Name of person being taught:  n/a    Instructions given:  n/a    Side Effects taught:  n/a    Response to teaching:  n/a      COMFORTABLE DYING MEASURE:  Is Patient/family satisfied with symptom level?  yes    DISCHARGE PLAN:  Patient is at [facility] LOC for unmanaged pain & agitation.

## 2018-03-19 NOTE — PROGRESS NOTES
Verbal shift change report given to Ayden Raya RN by Martha Lizarraga RN. Report included the following information SBAR, Kardex, Intake/Output and MAR.     1915  Report received from Martha Lizarraga RN. Patient assessed frequently during the night and medicated accordingly. PRN Haldol given 3 times IV during the night. PRN Morphine given 5 times IVduring the night. Left hand IV site leaking prior to 0446 dose and removed. New IV site located in left forearm, 20 g. Solis emptied for 275 ml dark he. 0700  Report given. NAME OF PATIENT:  Daniele Altamirano     LEVEL OF CARE:  [facility]     REASON FOR GIP: Unmanaged pain and agitation/     PATIENT REMAINS ELIGIBLE FOR GIP LEVEL OF CARE AS EVIDENCED BY: Patient continues to require frequent frequent assessments with adjustments and administration of medications in attempts to keep symptoms controlled.        REASON FOR RESPITE:  Patient not in Respite care at this time.     O2 SAFETY:  Patient is on room air at this time.     FALL INTERVENTIONS PROVIDED:   Implemented/recommended resources for alarm system (personal alarm, bed alarm, call bell, etc.) , Implemented/recommended environmental changes (remove hazards, lower bed, improve lighting, etc.) and Implemented/recommended increased supervision/assistance     INTERDISPLINARY COMMUNICATION/COLLABORATION:  Physician, MSW, [place] and RN, CNA     NEW MEDICATION INITIATION DOCUMENTATION:  n/a     Reason medication is being initiated:  n/a     MD / Provider name consulted re: change in status / initiation of new medication:  N/A     New Symptom(s):  N/A     New Order(s):  N/A     Name of the person notified of the changes:  N/A     Name of person being taught:  N/A     Instructions given:  N/A     Side Effects taught:  N/A     Response to teaching:  N/A      COMFORTABLE DYING MEASURE:  Monitor for pain, dyspnea, agitation, and restlessness and intervene accordingly.     Is Patient/family satisfied with symptom level?  yes     DISCHARGE PLAN:  Patient is in GIP LOC for unmanaged pain and agitation.

## 2018-03-19 NOTE — PROGRESS NOTES
[facility] Help to Those in Need  [phone]    Patient Name: Tegan Akers  YOB: 1940    Date of Provider Hospice Visit: 03/19/18    Level of Care:   [x] General Inpatient (GIP)    [] Routine   [] Respite    Location of Care:  [] [facility] [place] [] [facility] [] [id number] [place] [facility] [] [facility] [x] [facility]    Principle Hospice Diagnosis: Lewy Body Disease with dementia  Diagnoses RELATED to the terminal prognosis: atherosclerosis with CAD  Other Diagnoses: anxiety and depression  Tegan Akers is a 69y.o. year old who was admitted to [facility].      The patient's principle diagnosis has resulted in decline in function, pt is unable to ambulate as he has progressive weakness and muscle wasting, poor PO intake and dehydration, hallucinations and paranoid delusions have been ongoing and recently have been worse, he is combative, he has a new sacral wound with drainage noted and is on an antibiotic, he is dependence for all ADls, he needs support when sitting up in a chair, he has progressive stiffness in his muscles and rigidity, new areas of skin breakdown, has fallen recently and is now no longer able to walk, worsening behavioral disturbances.         Functionally, the patient's Karnofsky and/or Palliative Performance Scale has declined over a period of months  and is estimated at 20%. The patient is dependent on the following ADLs: he is dependent for all ALDS.     Objective information that support this patients limited prognosis includes:   Pt with hx of Lewy Body disease       The patient/family chose comfort measures with the support of Hospice. HOSPICE DIAGNOSES   Active Symptoms: 1. Lethargy/fatigue/weakness  2. Restlessness/agitation: Intermittent  3. Generalized pain: Nonverbal  4. Swallowing dysfunction/aspiration risk/inability to eat  5.   Declining function     PLAN   1. continue GIP level of care as pt required  9d oses of prn morphine IV and 6 doses of haldol IV all within 24 hours and remains uncomfortable and agiatated;  he is requiring frequent adjustments and administration of medications in attempts to keep symptoms controlled and these medications cannot be given at home or in a facility   2. Difficulty controlling agitation and pain. 3. Ativan was discontinued due to a paradoxical effect, now using haldol. 4. Do not give SL medication as pt chokes and aspirates on Sl dosing  5. Continue Haldol 2.5mg IV q. 2 hours prn  6. Added haldol 4mg IV every 6 hours scheduled   7. Added morphine 4 mg every 6 hours scheduled   8. Continue Morphine 2mg IV q. 15 min as needed prn.   9.  and SW to support family needs  10. Disposition: If patient survives routine care stay for 2-3 weeks and wife will consider moving him to the assisted living facility/nursing home    Prognosis estimated based on 03/19/18 clinical assessment is:   [x] Hours to Days    [] Days to Weeks    [] Other:    Communicated plan of care with: Hospice Case Manager; Hospice IDT; Care Team     GOALS OF CARE     Resuscitation Status: DNR  Durable DNR: [x] Yes [] No    No flowsheet data found. HISTORY     History obtained from: chart, wife, hospice staff    CHIEF COMPLAINT: Not applicable  The patient is:   [] Verbal  [x] Nonverbal  [x] Unresponsive    HPI/SUBJECTIVE: Patient seen lethargic and almost unresponsive. Wife at bedside. Patient is not able to take in any nutrition and hydration orally due to severe dysphagia and choking episodes.      REVIEW OF SYSTEMS     The following systems were: [] reviewed  [x] unable to be reviewed      Adult Non-Verbal Pain Assessment Score: 3    Face  [] 0   No particular expression or smile  [x] 1   Occasional grimace, tearing, frowning, wrinkled forehead  [] 2   Frequent grimace, tearing, frowning, wrinkled forehead    Activity (movement)  [] 0   Lying quietly, normal position  [x] 1   Seeking attention through movement or slow, cautious movement  [] 2 Restless, excessive activity and/or withdrawal reflexes    Guarding  [x] 0   Lying quietly, no positioning of hands over areas of body  [] 1   Splinting areas of the body, tense  [] 2   Rigid, stiff    Physiology (vital signs)  [] 0   Stable vital signs  [x] 1   Change in any of the following: SBP > 20mm Hg; HR > 20/minute  [] 2   Change in any of the following: SBP > 30mm Hg; HR > 25/minute    Respiratory  [x] 0   Baseline RR/SpO2, compliant with ventilator  [] 1   RR > 10 above baseline, or 5% drop SpO2, mild asynchrony with ventilator  [] 2   RR > 20 above baseline, or 10% drop SpO2, asynchrony with ventilator     FUNCTIONAL ASSESSMENT     Palliative Performance Scale (PPS): 20%     PSYCHOSOCIAL/SPIRITUAL ASSESSMENT     Active Problems:    * No active hospital problems. *    No past medical history on file. No past surgical history on file. Social History   Substance Use Topics    Smoking status: Not on file    Smokeless tobacco: Not on file    Alcohol use Not on file     No family history on file.    Allergies   Allergen Reactions    Cephalexin Hives    Levofloxacin Unknown (comments)    Penicillin Unknown (comments)    Pravastatin Unknown (comments)      Current Facility-Administered Medications   Medication Dose Route Frequency    haloperidol lactate (HALDOL) injection 2.5 mg  2.5 mg IntraVENous Q2H PRN    morphine injection 2 mg  2 mg IntraVENous Q15MIN PRN    scopolamine (TRANSDERM-SCOP) 1 mg over 3 days 1 Patch  1 Patch TransDERmal Q72H    atropine 1 % ophthalmic solution 2 Drop  2 Drop SubLINGual Q2H PRN    bisacodyl (DULCOLAX) suppository 10 mg  10 mg Rectal DAILY PRN    acetaminophen (TYLENOL) suppository 650 mg  650 mg Rectal Q4H PRN        PHYSICAL EXAM     Wt Readings from Last 3 Encounters:   12/05/17 80.7 kg (178 lb)       Visit Vitals    /60 (BP 1 Location: Right arm, BP Patient Position: At rest)    Pulse 66    Temp 97.5 °F (36.4 °C)    Resp 8    SpO2 98% Supplemental O2  [] Yes  [x] NO  Last bowel movement:     Currently this patient has:  [x] Peripheral IV [] PICC  [] PORT [] ICD    [] Solis Catheter [] NG Tube   [] PEG Tube    [] Rectal Tube [] Drain  [] Other:     Constitutional: obtunded  Eyes: Closed  ENMT: Dry mucous membranes  Cardiovascular: Distant heart sounds  Respiratory: Nonlabored breathing, Cheyne Padgett   Gastrointestinal: Soft, nontender, bowel sounds present  Musculoskeletal: No contractures or deformities and no tenderness to palpation  Skin: No rashes or bruises, no edema, patient has a stage III sacral decub that is tender to palpation and has a necrotic exudate and slough attached to the base of the ulcer, no drainage and no odor.   Neurologic: Lethargic, minimally responsive, moving all extremities and having intermittent restlessness and episodes of severe agitation  Psychiatric: N/A  Other:       Pertinent Lab and or Imaging Tests:  No results found for: NA, K, CL, CO2, AGAP, GLU, BUN, CREA, BUCR, GFRAA, GFRNA, CA, GFRAA  No results found for: TP, ALBR, TALB, ALB        Total time: 60 minutes  Counseling / coordination time: 40 minutes spent discussing with wife and hospice staff  > 50% counseling / coordination?:  Yes      Benn Sandhoff, NP

## 2018-03-19 NOTE — PROGRESS NOTES
[facility] SW/CH VISIT    BS Hospice Chaplain Initial Visit and Spiritual Assessment - LOC to GIP     I visited the room of this patient who is @ [facility] now on GIP status. He was in bed, unresponsive, breathing via open mouth displaying a regular pattern. He was slightly restless but appeared well medicated and comfortable. There was no family present. I had met this patient and his sister last week and knew he was [place]' but did not attend a traditional Judaism on a regular basis. I read Scripture and Psalms to him, offered words of comfort, assurance, and Spiritual encouragement. I then call his wife and did not receive an answer. I left a message on her phone indicating my visit. I left my contact information so she could reach me if sh so chooses. I also had left mu contact information in the room of the patient. GOALS:  Continue to visit this patient and his family, to provide pastoral care and spiritual support to assist both the patient and them with coping with this decline. Build trust and familiarity with the family to encourage a discussion of their spiritual thoughts and concerns at a deeper and more personal level if they so choose. Validate the emotions and normalize the anticipatory grief of the family regarding this declining situation. Offer written spiritual material to the family as needed or requested and provide a listening presence when needed. PLAN:   Continue to visit this patient and his family, while he is @ [facility] and I am in this facility, or PRN as requested. Coordinate with Care Team on POC.  VISIT FREQUENCY:   1 wk.2 starting 1/15/18  plus 4 prn 14 days.    Ayden Peck, 45 [facility] [place]    [phone]

## 2018-03-19 NOTE — PROGRESS NOTES
Problem: Falls - Risk of  Goal: *Absence of Falls  Document Naida Fall Risk and appropriate interventions in the flowsheet.    Outcome: Progressing Towards Goal  Fall Risk Interventions:       Mentation Interventions: Bed/chair exit alarm    Medication Interventions: Bed/chair exit alarm    Elimination Interventions: Bed/chair exit alarm    History of Falls Interventions: Bed/chair exit alarm

## 2018-03-19 NOTE — PROGRESS NOTES
0700- Received report from [address]   utilizing SBAR, I/O and Kardex  Patient was admitted to the [facility]   Level of care   Hospice Diagnosis- LB Dementia  Other History includes-     0830 Assessment completed   Neuro- responds to stimulus  Muscular-moves all extremities, rigid, guarding  Resp-even unlabored, lungs sounds  Oxygen - room air  Cardio-Heart regular, pulses palpable, skin warm and dry, cap refill less than 3 sec, distal extremities warm   GI- Abd soft, nondistended, nontender, bowel sounds hypoactive, incontinent of bowels,   Diet - NPO  Last BM - 3/13  -incontinent of bladder   Solis in place draining   Skin/Wounds - buttocks area x 2  Edema- none  Access sites- L FA 20 gauze  Family- wife    0835 Restless moaning out, medicated with prn haldol and morphine  1030 PRN morphine given for moaning guarding during am bath effective  1126 MSW report pt is gurgling slightly, atropine gtts administered education to wife  1200 Noted brain autopsy protocol calls for death certificate to be completed and signed before transport to the autopsy location. Discussed with wife and then later MSW. Wife plan to obtain blank death certificate and have NP Torres have ready. MSW to follow up.  1300 Pt started on scheduled haldol and morphine at this time, education to wife. 80 Pt is jerking extremities, moaning visitor asked for pain meds, medicated with prn haldol and morphine. 1800 Resting quietly  1900 Report to oncoming shift.         NAME OF PATIENT: Amilcar Booth      LEVEL OF CARE:  [facility]      REASON FOR GIP: Unmanaged pain and agitation/      PATIENT REMAINS ELIGIBLE FOR [facility] LEVEL OF CARE AS EVIDENCED BY: Patient continues to require frequent frequent assessments with adjustments and administration of medications in attempts to keep symptoms controlled.         REASON FOR RESPITE:  Patient not in Respite care at this time.      O2 SAFETY:  Patient is on room air at this time.  [address] PROVIDED:   Implemented/recommended resources for alarm system (personal alarm, bed alarm, call bell, etc.) , Implemented/recommended environmental changes (remove hazards, lower bed, improve lighting, etc.) and Implemented/recommended increased supervision/assistance      INTERDISPLINARY COMMUNICATION/COLLABORATION:  Physician, MSW, [place] and RN, CNA      NEW MEDICATION INITIATION DOCUMENTATION:  n/a      Reason medication is being initiated:  n/a      MD / Provider name consulted re: change in status / initiation of new medication:  N/A      New Symptom(s):  N/A      New Order(s):  N/A      Name of the person notified of the changes:  N/A      Name of person being taught:  N/A      Instructions given:  N/A      Side Effects taught:  N/A      Response to teaching:  N/A       COMFORTABLE DYING MEASURE:  Monitor for pain, dyspnea, agitation, and restlessness and intervene accordingly.     Is Patient/family satisfied with symptom level?  yes      DISCHARGE PLAN:  Patient is in GIP LOC for unmanaged pain and agitation.

## 2018-03-20 NOTE — PROGRESS NOTES
Care Coordination Note    As requested by the wife, Teena Mccrary, of this patient, I e-mailed her several spiritual passages about which we had spoken yesterday during my visit. At the end of that visit, she had requested copies.     Jarod Cullen, MTS, [facility]  Hospice Chaplain

## 2018-03-20 NOTE — PROGRESS NOTES
[facility] SW/CH VISIT     BS Hospice Chaplain Follow-up PRN visit    I visited the room of this patient who is @ [facility] now on GIP status. He was in bed, unresponsive, breathing via open mouth and displaying a regular breath pattern. He was slightly restless but appeared well medicated and comfortable. There was no family present. I had met this patient, his wife, Salvador Zuleta, and his sister, Felicia Quigley, during earlier visits and knew [facility] but did not attend a traditional Nondenomin[employer] on a regular basis. I read Scripture and Psalms to him, offered words of comfort, assurance, and Spiritual encouragement. GOALS:  Continue to visit this patient and his family, to provide pastoral care and spiritual support to assist both the patient and them with coping with this decline. Build trust and familiarity with the family to encourage a discussion of their spiritual thoughts and concerns at a deeper and more personal level if they so choose. Validate the emotions and normalize the anticipatory grief of the family regarding this declining situation. Offer written spiritual material to the family as needed or requested and provide a listening presence when needed. PLAN:   Continue to visit this patient and his family, while he is @ [facility] and I am in this facility, or PRN as requested. Coordinate with Care Team on POC.  VISIT FREQUENCY:   1 wk.2 starting 3/15/18  plus 4 prn 14 days.    Majo Benedict, 45 [place]    [phone]

## 2018-03-20 NOTE — PROGRESS NOTES
1900: Shift report received from OLEGARIO Gomez.  1945: Pt in bed, breathing irregular with episodes of apnea. Diminished lungs, hypoactive bowel sounds with unknown last BM. VS stable. Pt grimaces occasionally. Medicated with scheduled meds. 0010: Medicated with scheduled meds. Pt repositioned. He moans and appears to be in pain during movement. Pain med given (scheduled). 0400: Medicated with scheduled med. Pt still has episodes of apnea, twitching/nonpurposeful movements intermittently.      NAME OF PATIENT:  Windy Allred    LEVEL OF CARE:  GIP    REASON FOR GIP:   Pain, despite numerous changes in medications, Medication adjustment that must be monitored 24/7 and Stabilizing treatment that cannot take place at home    *PATIENT REMAINS ELIGIBLE FOR GIP LEVEL OF CARE AS EVIDENCED BY: (MUST BE ADDRESSED OF PATIENT GIP)      O2 SAFETY:  n/a    FALL INTERVENTIONS PROVIDED:   Implemented/recommended use of fall risk identification flag to all team members, Implemented/recommended assistive devices and encouraged their use, Implemented/recommended resources for alarm system (personal alarm, bed alarm, call bell, etc.)  and Implemented/recommended environmental changes (remove hazards, lower bed, improve lighting, etc.)    INTERDISPLINARY COMMUNICATION/COLLABORATION:  Physician, MSW, [place] and RN, CNA    NEW MEDICATION INITIATION DOCUMENTATION:  no changes made on ngiht shift    COMFORTABLE DYING MEASURE:  Is Patient/family satisfied with symptom level?  yes    DISCHARGE PLAN:  Most likely end of life at the [facility]

## 2018-03-20 NOTE — PROGRESS NOTES
[facility]   Good Help to Those in Need  [phone]    Patient Name: Claudene Radish  YOB: 1940    Date of Provider Hospice Visit: 03/20/18    Level of Care:   [x] General Inpatient (GIP)    [] Routine   [] Respite    Location of Care:  [] [facility] [] [facility] [] [facility] [] [facility] [x] Hospice [facility]    Principle Hospice Diagnosis: Lewy Body Disease with dementia  Diagnoses RELATED to the terminal prognosis: atherosclerosis with CAD  Other Diagnoses: anxiety and depression        HOSPICE DIAGNOSES   Active Symptoms:  1. Lethargy/fatigue/weakness  2. Restlessness/agitation: Intermittent  3. Generalized pain: Nonverbal  4. Swallowing dysfunction/aspiration risk/inability to eat  5. Declining function  6. Myoclonic jerking     PLAN   1. Continue GIP; requiring frequent adjustments and administration of medications in attempts to keep symptoms controlled. Pt continues with symptoms of non verbal pain indicators with grimacing and moaning/groaning, requiring frequent evaluation and adjustments of medications, continues to require IV pain and symptom medications which cannot be given at home, pt  has required 5 doses of haldol PRN  in addition to scheduled dosing of haldol every 6 hours, 4 x day  2. Difficulty controlling agitation and pain, now with myoclonic jerking, continues with non verbal pain and symptoms of agitation    3. Continue Haldol 4mg IV q. 6 hours scheduled and will increased prn haldol to 4mg every 2 hours as needed  4. Change Morphine to Dilaudid 0.5mg every 15 min as needed and scheduled 1mg every 4 hours dosing as morphine may be causing myoclonic jerking   5.  and SW to support family needs  6.  Disposition: If patient survives routine care stay for 2-3 weeks and wife will consider moving him to the assisted living facility/nursing home    Prognosis estimated based on 03/20/18 clinical assessment is:   [x] Hours to Days    [] Days to Weeks    [] Other:    Communicated plan of care with: Hospice Case Manager; Hospice IDT; Care Team     GOALS OF CARE     Resuscitation Status: DNR  Durable DNR: [x] Yes [] No    No flowsheet data found. HISTORY     History obtained from: chart, wife, hospice staff    CHIEF COMPLAINT: Not applicable  The patient is:   [] Verbal  [x] Nonverbal  [x] Unresponsive    HPI/SUBJECTIVE: Patient seen lethargic and almost unresponsive. Wife at bedside. Patient had episode of aspiration and choking this morning when he was being given morning medications mixed in puréed food. REVIEW OF SYSTEMS     The following systems were: [] reviewed  [x] unable to be reviewed      Adult Non-Verbal Pain Assessment Score: 5/10    Face  [] 0   No particular expression or smile  [] 1   Occasional grimace, tearing, frowning, wrinkled forehead  [x] 2   Frequent grimace, tearing, frowning, wrinkled forehead    Activity (movement)  [] 0   Lying quietly, normal position  [] 1   Seeking attention through movement or slow, cautious movement  [x] 2   Restless, excessive activity and/or withdrawal reflexes    Guarding  [x] 0   Lying quietly, no positioning of hands over areas of body  [] 1   Splinting areas of the body, tense  [] 2   Rigid, stiff    Physiology (vital signs)  [] 0   Stable vital signs  [x] 1   Change in any of the following: SBP > 20mm Hg; HR > 20/minute  [] 2   Change in any of the following: SBP > 30mm Hg; HR > 25/minute    Respiratory  [x] 0   Baseline RR/SpO2, compliant with ventilator  [] 1   RR > 10 above baseline, or 5% drop SpO2, mild asynchrony with ventilator  [] 2   RR > 20 above baseline, or 10% drop SpO2, asynchrony with ventilator     FUNCTIONAL ASSESSMENT     Palliative Performance Scale (PPS): 20%     PSYCHOSOCIAL/SPIRITUAL ASSESSMENT     Active Problems:    * No active hospital problems. *    No past medical history on file. No past surgical history on file.    Social History   Substance Use Topics    Smoking status: Not on file    Smokeless tobacco: Not on file    Alcohol use Not on file     No family history on file. Allergies   Allergen Reactions    Cephalexin Hives    Levofloxacin Unknown (comments)    Penicillin Unknown (comments)    Pravastatin Unknown (comments)      Current Facility-Administered Medications   Medication Dose Route Frequency    HYDROmorphone (PF) (DILAUDID) injection 0.5 mg  0.5 mg IntraVENous Q15MIN PRN    HYDROmorphone (PF) (DILAUDID) injection 1 mg  1 mg IntraVENous Q4H    haloperidol lactate (HALDOL) injection 4 mg  4 mg IntraVENous Q6H    haloperidol lactate (HALDOL) injection 2.5 mg  2.5 mg IntraVENous Q2H PRN    scopolamine (TRANSDERM-SCOP) 1 mg over 3 days 1 Patch  1 Patch TransDERmal Q72H    atropine 1 % ophthalmic solution 2 Drop  2 Drop SubLINGual Q2H PRN    bisacodyl (DULCOLAX) suppository 10 mg  10 mg Rectal DAILY PRN    acetaminophen (TYLENOL) suppository 650 mg  650 mg Rectal Q4H PRN        PHYSICAL EXAM     Wt Readings from Last 3 Encounters:   12/05/17 80.7 kg (178 lb)       Visit Vitals    BP 90/48 (BP 1 Location: Left arm, BP Patient Position: At rest)    Pulse 65    Temp 96.9 °F (36.1 °C)    Resp 8    SpO2 97%       Supplemental O2  [] Yes  [x] NO  Last bowel movement:     Currently this patient has:  [x] Peripheral IV [] PICC  [] PORT [] ICD    [] Solis Catheter [] NG Tube   [] PEG Tube    [] Rectal Tube [] Drain  [] Other:     Constitutional: obtunded  Eyes: Closed  ENMT: Dry mucous membranes  Cardiovascular: Distant heart sounds  Respiratory: Nonlabored breathing, Cheyne Padgett   Gastrointestinal: Soft, nontender, bowel sounds present  Musculoskeletal: No contractures or deformities and no tenderness to palpation  Skin: No rashes or bruises, no edema, patient has a stage III sacral decub that is tender to palpation and has a necrotic exudate and slough attached to the base of the ulcer, no drainage and no odor.   Neurologic: Lethargic, minimally responsive, moving all extremities and having intermittent restlessness, myoclonic jerking  Psychiatric: N/A  Other:       Pertinent Lab and or Imaging Tests:  No results found for: NA, K, CL, CO2, AGAP, GLU, BUN, CREA, BUCR, GFRAA, GFRNA, CA, GFRAA  No results found for: TP, ALBR, TALB, ALB        Total time: 35 minutes  Counseling / coordination time: 20 minutes spent discussing with wife and hospice staff  > 50% counseling / coordination?:  Yes      Noamie Robles NP  Met with family at length to discuss goals of care and continue total comfort care, pt is not eating or drinking, he has multiple symptom issues and continues GIP level of care.

## 2018-03-20 NOTE — PROGRESS NOTES
Verbal shift change report given to Pierce Fenton RN by Mariano Shook RN. Report included the following information SBAR, Kardex, Intake/Output and MAR.      1905  Report received from Mariano Shook RN. Patient assessed frequently during the night and medicated accordingly. Scheduled Haldol and scheduled morphine given. In addition, PRN Haldol given 1 time IV during the night and PRN Morphine given 1 time IV during the night.    0600  Solis emptied for 300 ml dark he. Dressings on bottom checked. Both dry and intact. Duoderm not disturbed. No drainage, no odor.     0700  Report given to Boni Lora RN.       NAME OF PATIENT: Isaias Doshi  LEVEL OF CARE:  GIP      REASON FOR GIP:  Unmanaged pain and agitation.      PATIENT REMAINS ELIGIBLE FOR [facility] LEVEL OF CARE AS EVIDENCED BY: Patient continues to require frequent frequent assessments with adjustments and administration of medications in attempts to keep symptoms controlled.         REASON FOR RESPITE:  Patient not in Respite care at this time.      O2 SAFETY:  Patient is on room air at this time.  [address]:   Implemented/recommended resources for alarm system (personal alarm, bed alarm, call bell, etc.) , Implemented/recommended environmental changes (remove hazards, lower bed, improve lighting, etc.) and Implemented/recommended increased supervision/assistance      INTERDISPLINARY COMMUNICATION/COLLABORATION:  Physician, MSW, [place] and RN, CNA      NEW MEDICATION INITIATION DOCUMENTATION:  N/A      Reason medication is being initiated:  N/A      MD / Provider name consulted re: change in status / initiation of new medication:  N/A      New Symptom(s):  N/A      New Order(s):  N/A      Name of the person notified of the changes:  N/A      Name of person being taught:  N/A      Instructions given:  N/A      Side Effects taught:  N/A      Response to teaching:  N/A       COMFORTABLE DYING MEASURE:  Monitor for pain, dyspnea, agitation, and restlessness and intervene accordingly.     Is Patient/family satisfied with symptom level?  yes      DISCHARGE PLAN:  Patient is in GIP LOC for unmanaged pain and agitation. Discharge plans in process for once Routine period is complete.

## 2018-03-20 NOTE — PROGRESS NOTES
NAME OF PATIENT:  Sergio Overcast    LEVEL OF CARE:  GIP    REASON FOR GIP:   Pain, despite numerous changes in medications, Terminal agitation, despite changes to medications, Medication adjustment that must be monitored 24/7 and Stabilizing treatment that cannot take place at home    *PATIENT REMAINS ELIGIBLE FOR GIP LEVEL OF CARE AS EVIDENCED BY: Continued need for aggressive pain management and management of agitation  O2 SAFETY:  On R/A at present    FALL INTERVENTIONS PROVIDED:   Implemented/recommended resources for alarm system (personal alarm, bed alarm, call bell, etc.)  and Implemented/recommended increased supervision/assistance    INTERDISPLINARY COMMUNICATION/COLLABORATION:  Physician, MSW, [place] and RN, CNA    NEW MEDICATION INITIATION DOCUMENTATION:  N/A at this time    Reason medication is being initiated:  N/A    MD / Provider name consulted re: change in status / initiation of new medication:  N/A    New Symptom(s):  N/A    New Order(s):  N/A    Name of the person notified of the changes:  N/A    Name of person being taught:  N/A    Instructions given:  N/A    Side Effects taught:  N/A    Response to teaching:  N/A      COMFORTABLE DYING MEASURE:  Is Patient/family satisfied with symptom level?  yes    DISCHARGE PLAN:  SW to work on [address] with family    0700  Report from previous shift  [phone]  Scheduled med given as per MARs. Client grimacing with light touch movement PRN Morphine given per L FA PIV intact without redness or irritation. PA completed. Will continue to monitor  0815  Resting with relaxed facial muscles and even unlabored respirations. Will continue to monitor  0941  Resting without apparent distress. Premedicated for incident pain as we will complete bed and bath  1010  Client opened eyes and pulled at anything close to him during bed and bath. Otherwise tolerated procedure without apparent distress. No grimaces noted.   Turned and positioned on L side following Dressings to buttocks remain D and I.    1110  Resting even unlabored respirations  Eyes closed and facial muscles relaxed  1210  Client opens eyes some with wife present. No apparent distress  Wife reports she has service planned for Sunday feeling like this is safe. Also she has pictures she is reviewing to put with anoucement  1253  Scheduled medications given IV at this time as per MARs. Wife and sister in room  Client appears without distress. Intermittent muscles jerking noted  1400  Turned and positioned to R side. Slight grimace noted otherwise tolerated procedure without signs of distress  1413  Becames agitated s/p turning and positioning. Apparent distress with facial grimacing. PRN morphine as per MARs  Pulling at pillows. Will monitor for effect. 1520  Resting quietly without apparent distress. No visitors in room at present  [address] Wife sister and Ye Bacon in room. Client with eyes closed and unlabored respirations. Continuing with intermittent muscles jerking. Educated wife on change in medications. States her understanding and medication given per L FA PIV. Will continue to monitor  1742 Opens eyes with positioning. Moving arms about some but does not appear agitated or in pain at present. Will continue to monitor  1852  Resting without apparent distress. Some muscle jerking noted to upper extremities. Scheduled medications given. Wife and sister in room talking.   Will continue to monitor

## 2018-03-20 NOTE — PROGRESS NOTES
Problem: Pressure Injury - Risk of  Goal: *Prevention of pressure ulcer  Outcome: Progressing Towards Goal  Dressing to B buttocks area are DDI. Turning and positioning to prevent further breakdown    Problem: Falls - Risk of  Goal: *Absence of Falls  Document Naida Fall Risk and appropriate interventions in the flowsheet. Outcome: Progressing Towards Goal  Fall Risk Interventions:       Mentation Interventions: Bed/chair exit alarm    Medication Interventions: Bed/chair exit alarm    Elimination Interventions: Bed/chair exit alarm, Call light in reach    History of Falls Interventions: Bed/chair exit alarm, Room close to nurse's station        Problem: Comfort Deficit  Goal: Reduce/control pain  Patient will report that pain has been reduced or controlled through verbal and nonverbal means and that measures to promote comfort are effective. Outcome: Progressing Towards Goal  Medicating scheduled and PRN to prevent/control agitation and pain.   Using non verbal cues  Positioning for comfort

## 2018-03-20 NOTE — PROGRESS NOTES
[facility] SW/CH VISIT     BS Hospice Chaplain Follow-up Visit/ meet with wife.      I visited the room of this patient who is @ [facility] now on GIP status. He was in bed, occasionally would open his eyes, but was not vocally responsive. He was slightly restless, occasionally would have leg spasms, but appeared well medicated and comfortable. He had not changed greatly since my earlier visit this morning. His wife, Cindy Apgar was present. I introduced myself and she said she had heard much about me from [address], the sister of the patient and her friend, Rosalia Kamara. She was familiar with  as she has many friend in the [address] at [facility]. Cindy Apgar and I had a long, wide ranging discussion. She is an MS, RN-BS at [employer] in the [employer].  We knew many of the same people. She and the patient have not been active in a specific [employer] in many years as the patient was not \"interested in [employer]. \"  Cindy Apgar is very spiritual and appeared to enjoy speaking about her shruthi and experiences. This is the 2nd marriage for both and she has two grown children. Both children have visited their step-father but are slightly to greatly estranged from him. Kate Villeda, a  at [facility], will conduct the  service for the patient. Cindy Apgar has good work and social support with many friends offering aid and assistance. She appears to be coping appropriately. She was very appreciate for my time and insights/. I gave her my contact information and she asked me to e-mail several pieces of literature which I shared orally with her during my visit. GOALS:  Continue to visit this patient and his family, to provide pastoral care and spiritual support to assist both the patient and them with coping with this decline. Build trust and familiarity with the family to encourage a discussion of their spiritual thoughts and concerns at a deeper and more personal level if they so choose.    Validate the emotions and normalize the anticipatory grief of the family regarding this declining situation. Coordinate with  Arline regarding  Support for this patient and family while at the [facility]. PLAN:   Continue to visit this patient and his family, while he is @ [facility] and I am in this facility, or PRN as requested. Coordinate with Care Team on POC.  VISIT FREQUENCY:   1 wk.2 starting 1/15/18  plus 4 prn 14 days.    Rene Nicole, [address] [facility] [place]    [phone]

## 2018-03-21 NOTE — PROGRESS NOTES
0700:  Verbal shift change report given to Nida Ramsey RN (oncoming nurse) by Lenny Cloud RN (offgoing nurse). Report included the following information SBAR, Kardex, Intake/Output and MAR.   0800:  Assessed pt and administered scheduled dilaudid 1mg/IV: responds to touch (opens eyes), LS clear, HR 63/regular, pulses palpable, skin warm to touch. Pt showing s/s of agitation (twitching, movement of arms/shoulders). Administered prn haldol 4mg/IV. Will perform wound care later today. 1000:  Rounded; no s/s of distress. 1120:  Rounded; no s/s of distress. 1230:  Administered scheduled medications; no s/s of distress; family present. 1430:  Administered new scheduled medications (lorazepam 0.5mg/IV & phenobarbital 30mg/IV). Educated wife on new medications and their side effects. Wife verbalized understanding. 1540:  Rounded; pt moaning/groaning and twitching with shoulder movement (pain/agitation). Administered prn dilaudid 0.5mg/IV and haldol 4mg/IV. Will continue to monitor. 1630:  Reassessed pain/agitation; no s/s of agitation but pt was grimacing/moaning. Administered prn dilaudid 0.5mg/IV.    1700:  Patient received full bath from Heidi Davey CNA. Performed wound care; tolerated fair (see Wound Care). 1800:  Rounded; no s/s of distress  1900:  Administered scheduled haldol 4mg/IV; no s/s of distress; pt looks comfortable; family present. NAME OF PATIENT:  Sergio Cabrera    LEVEL OF CARE:  GIP    REASON FOR GIP:   Pain, despite numerous changes in medications, Medication adjustment that must be monitored 24/7 and Stabilizing treatment that cannot take place at home    *PATIENT REMAINS ELIGIBLE FOR GIP LEVEL OF CARE AS EVIDENCED BY: (MUST BE ADDRESSED OF PATIENT GIP) Continue GIP; requiring frequent adjustments and administration of medications in attempts to keep symptoms controlled.  Pt continues with symptoms of non verbal pain indicators with grimacing and moaning/groaning, requiring frequent evaluation and adjustments of medications, continues to require IV pain and symptom        REASON FOR RESPITE:  n/a    O2 SAFETY:  n/a    FALL INTERVENTIONS PROVIDED:   Implemented/recommended resources for alarm system (personal alarm, bed alarm, call bell, etc.) , Implemented/recommended environmental changes (remove hazards, lower bed, improve lighting, etc.) and Implemented/recommended increased supervision/assistance    INTERDISPLINARY COMMUNICATION/COLLABORATION:  Physician, MSW, [place] and RN, CNA    NEW MEDICATION INITIATION DOCUMENTATION:  n/a    Reason medication is being initiated:  n/a    MD / Provider name consulted re: change in status / initiation of new medication:  n/a    New Symptom(s):  n/a    New Order(s):  n/a    Name of the person notified of the changes:  n/a    Name of person being taught:  n/a    Instructions given:  n/a    Side Effects taught:  n/a    Response to teaching:  n/a      COMFORTABLE DYING MEASURE:  Is Patient/family satisfied with symptom level?  yes    DISCHARGE PLAN:  Patient is at [facility] LOC. Once symptoms have been controlled, pt will return to Routine LOC. MSW is working with family of discharge planning; pt unable to go back to previous residence.

## 2018-03-21 NOTE — PROGRESS NOTES
[facility]   Good Help to Those in Need  [phone]    Patient Name: Lala Maurice  YOB: 1940    Date of Provider Hospice Visit: 03/21/18    Level of Care:   [x] General Inpatient (GIP)    [] Routine   [] Respite    Location of Care:  [] [facility] [] [facility] [] [facility] [] [facility] [x] Hospice [facility]    Principle Hospice Diagnosis: Lewy Body Disease with dementia  Diagnoses RELATED to the terminal prognosis: atherosclerosis with CAD  Other Diagnoses: anxiety and depression        HOSPICE DIAGNOSES   Active Symptoms:  1. Lethargy/fatigue/weakness  2. Restlessness/agitation: Intermittent  3. Generalized pain: Nonverbal  4. Swallowing dysfunction/aspiration risk/inability to eat  5. Declining function  6. Myoclonic jerking     PLAN   1. Pt continues with many non verbal pain indicators now 6/10 upon assessment  especially when moved, continues with myoclonic jerking, Continue GIP; requiring frequent adjustments and administration of medications in attempts to keep symptoms controlled. Pt continues with symptoms of non verbal pain indicators with grimacing and moaning/groaning, requiring frequent evaluation and adjustments of medications, continues to require IV pain and symptom medications which cannot be given at home, pt  has required 1 dose  of haldol PRN  in addition to scheduled dosing of haldol every 6 hours, 4 x day, total of 5 doses in 24 hours, hydromorphone 1mg had received 7 doses in 24 hours,   2. Pt continues to require ongoing and frequent assessment and added valium for myoclonic jerking  3. Difficulty controlling agitation and pain, continues with myoclonic jerking, continues with non verbal pain and symptoms of agitation but have become less frequent    4. Continue Haldol 4mg IV q. 6 hours scheduled and continueprn haldol to 4mg every 2 hours as needed  5.  Dilaudid 0.5mg every 15 min as needed and scheduled 1mg every 4 hours dosing as morphine may be causing myoclonic jerking 6. Added phenobarbital 30 mg q 8 hours and ativan 0.5 mg q 8 hours  for myoclonic jerking   7.  and SW to support family needs  8. Disposition: If patient survives routine care stay for 2-3 weeks and wife will consider moving him to the assisted living facility/nursing home    Prognosis estimated based on 03/21/18 clinical assessment is:   [x] Hours to Days    [] Days to Weeks    [] Other:    Communicated plan of care with: Hospice Case Manager; Hospice IDT; Care Team     GOALS OF CARE     Resuscitation Status: DNR  Durable DNR: [x] Yes [] No    No flowsheet data found. HISTORY     History obtained from: chart, wife, hospice staff    CHIEF COMPLAINT: Not applicable  The patient is:   [] Verbal  [x] Nonverbal  [x] Unresponsive    HPI/SUBJECTIVE: Patient seen lethargic and almost unresponsive. Wife at bedside. Patient had episode of aspiration and choking this morning when he was being given morning medications mixed in puréed food.        REVIEW OF SYSTEMS     The following systems were: [] reviewed  [x] unable to be reviewed      Adult Non-Verbal Pain Assessment Score: 6/10    Face  [] 0   No particular expression or smile  [] 1   Occasional grimace, tearing, frowning, wrinkled forehead  [x] 2   Frequent grimace, tearing, frowning, wrinkled forehead    Activity (movement)  [] 0   Lying quietly, normal position  [] 1   Seeking attention through movement or slow, cautious movement  [x] 2   Restless, excessive activity and/or withdrawal reflexes    Guarding  [] 0   Lying quietly, no positioning of hands over areas of body  [] 1   Splinting areas of the body, tense  [x] 2   Rigid, stiff    Physiology (vital signs)  [] 0   Stable vital signs  [x] 1   Change in any of the following: SBP > 20mm Hg; HR > 20/minute  [] 2   Change in any of the following: SBP > 30mm Hg; HR > 25/minute    Respiratory  [x] 0   Baseline RR/SpO2, compliant with ventilator  [] 1   RR > 10 above baseline, or 5% drop SpO2, mild asynchrony with ventilator  [] 2   RR > 20 above baseline, or 10% drop SpO2, asynchrony with ventilator     FUNCTIONAL ASSESSMENT     Palliative Performance Scale (PPS): 10%     PSYCHOSOCIAL/SPIRITUAL ASSESSMENT     Active Problems:    * No active hospital problems. *    No past medical history on file. No past surgical history on file. Social History   Substance Use Topics    Smoking status: Not on file    Smokeless tobacco: Not on file    Alcohol use Not on file     No family history on file.    Allergies   Allergen Reactions    Cephalexin Hives    Levofloxacin Unknown (comments)    Penicillin Unknown (comments)    Pravastatin Unknown (comments)      Current Facility-Administered Medications   Medication Dose Route Frequency    HYDROmorphone (PF) (DILAUDID) injection 0.5 mg  0.5 mg IntraVENous Q15MIN PRN    HYDROmorphone (PF) (DILAUDID) injection 1 mg  1 mg IntraVENous Q4H    haloperidol lactate (HALDOL) injection 4 mg  4 mg IntraVENous Q2H PRN    haloperidol lactate (HALDOL) injection 4 mg  4 mg IntraVENous Q6H    scopolamine (TRANSDERM-SCOP) 1 mg over 3 days 1 Patch  1 Patch TransDERmal Q72H    atropine 1 % ophthalmic solution 2 Drop  2 Drop SubLINGual Q2H PRN    bisacodyl (DULCOLAX) suppository 10 mg  10 mg Rectal DAILY PRN    acetaminophen (TYLENOL) suppository 650 mg  650 mg Rectal Q4H PRN        PHYSICAL EXAM     Wt Readings from Last 3 Encounters:   12/05/17 80.7 kg (178 lb)       Visit Vitals    BP 92/48 (BP 1 Location: Right arm, BP Patient Position: At rest)    Pulse 63    Temp 97.9 °F (36.6 °C)    Resp 10    SpO2 96%       Supplemental O2  [] Yes  [x] NO  Last bowel movement:     Currently this patient has:  [x] Peripheral IV [] PICC  [] PORT [] ICD    [] Solis Catheter [] NG Tube   [] PEG Tube    [] Rectal Tube [] Drain  [] Other:     Constitutional: obtunded, stiff, non verbal, myoclonic jerking noted  Eyes: Closed  ENMT: Dry mucous membranes  Cardiovascular: Distant heart sounds  Respiratory: Nonlabored breathing, Cheyne Padgett   Gastrointestinal: Soft, nontender, bowel sounds present  Musculoskeletal: No contractures or deformities and no tenderness to palpation  Skin: No rashes or bruises, no edema, patient has a stage III sacral decub that is tender to palpation and has a necrotic exudate and slough attached to the base of the ulcer, no drainage and no odor. Neurologic: Lethargic, minimally responsive, moving all extremities and having intermittent restlessness, myoclonic jerking  Psychiatric: N/A  Other:       Pertinent Lab and or Imaging Tests:  No results found for: NA, K, CL, CO2, AGAP, GLU, BUN, CREA, BUCR, GFRAA, GFRNA, CA, GFRAA  No results found for: TP, ALBR, TALB, ALB        Total time: 35 minutes  Counseling / coordination time: 20 minutes spent discussing with wife and hospice staff  > 50% counseling / coordination?:  Yes      Kyle Narayanan, NP  Met with family again today, pt  has multiple symptom issues and continues [facility] level of care.

## 2018-03-22 NOTE — PROGRESS NOTES
1900: Shift report received from OLEGARIO Gomez.  2000: Pt in bed, minimally responsive, no grimacing, no moaning, no s/s of pain or agitation noted. Pt appears comfortable, displaying shallow breathing patter. Family just leaving for the night. Son, Shabana, arrived today from [facility] and was able  To visit with his dad. Pt medicated with scheduled Dilaudid via iv in right wrist. Pt's lungs are clear. Abdomen flat, non-tender with last BM on 3/14/18.  2200: Scheduled meds administered. 0030: Pt resting and appears very comfortable. Shallow respirations. Medicated with scheduled meds. 0500: Pt repositioned, starts grimacing, mouth care provided. NAME OF PATIENT:  Wayne Bradley    LEVEL OF CARE:  Routine    O2 SAFETY:  n/a    FALL INTERVENTIONS PROVIDED:   Implemented/recommended assistive devices and encouraged their use, Implemented/recommended resources for alarm system (personal alarm, bed alarm, call bell, etc.)  and Implemented/recommended environmental changes (remove hazards, lower bed, improve lighting, etc.)    INTERDISPLINARY COMMUNICATION/COLLABORATION:  Physician, MSW, [place] and RN, CNA    NEW MEDICATION INITIATION DOCUMENTATION:  no changes on this shift    COMFORTABLE DYING MEASURE:  Is Patient/family satisfied with symptom level?  yes    DISCHARGE PLAN:  Most likely end of life at the [facility].

## 2018-03-22 NOTE — PROGRESS NOTES
Problem: Pressure Injury - Risk of  Goal: *Prevention of pressure ulcer  Outcome: Progressing Towards Goal  Turning and positioning to prevent additional breakdown. DDI to sacrum. Poor nutritional status    Problem: Comfort Deficit  Goal: Reduce/control pain  Patient will report that pain has been reduced or controlled through verbal and nonverbal means and that measures to promote comfort are effective.    Outcome: Progressing Towards Goal  PRN and scheduled medications to aid comfort

## 2018-03-22 NOTE — ROUTINE PROCESS
[facility] SW/CH VISIT      [facility] Hospice Chaplain Follow-up PRN visit     In the corridor outside of this patients room, I met the wife, Karina Phan, of this patient who introduced me to her sister Rhys Melvin. Rhys Melvin had driven from [place], [place] to help support Karina Phan during the continued decline of the patient. We spoke for some time in the [place] regarding their plans. Karina Phan said she was considering having a Memorial Service at [facility] this  as most of the family is coming from out of town this weekend. When he passes, there will be a donation of his brain to [facility] and then his remains will be cremated. Karina Phan was not concerned that the patient might still be alive as she sees this service as a celebration of life rather than a .  We then visited the room of this patient. He was in bed, unresponsive, breathing via open mouth and displaying a regular breath pattern. He was slightly restless but appeared well medicated and comfortable. He appeared more drawn and in a deeper state of unconsciousness than during my visit yesterday. Karina Phan said she thought is was less responsive to both her and Rhys Melvin than yesterday  I continued my ministry of presence, active listening, validation of emotions, normalization, offered words of comfort, and spiritual encouragement. Both thanked me for visiting. GOALS:  Continue to visit this patient and his family, to provide pastoral care and spiritual support to assist both the patient and them with coping with this decline. Build trust and familiarity with the family to encourage a discussion of their spiritual thoughts and concerns at a deeper and more personal level if they so choose. Validate the emotions and normalize the anticipatory grief of the family regarding this declining situation. Offer written spiritual material to the family as needed or requested and provide a listening presence when needed.    PLAN:   Continue to visit this patient and his family, while he is @ [facility] and I am in this facility, or PRN as requested. Coordinate with Care Team on POC.  VISIT FREQUENCY:   1 wk.2 starting 3/15/18  plus 4 prn 14 days.    Jett Sanchez, [address] [place]    [phone]

## 2018-03-22 NOTE — [facility] IDG NURSE NOTES
Patient: Adriana Lawton    Date: 03/22/18  Time: 6:27 AM    [facility] Nurse Notes  This patient was admitted with a diagnosis of Lewy Body Dementia on 3/15/18. Pt had to switch from Respite to GIP due to uncontrolled pain and agitation, but was able to switch to Routine LOC as of today since pain is controlled with recently initiated Dilaudid 1 mg iv, as well as Haldol and Ativan. Phenobarbital initiated yesterday for twitching and nonpurposeful movement of upper extremities. Pt has not shown any s/s of pain or agitation, only responds to noxious stimuli (turning) by grimacing. Overall appears very comfortable at the moment. Pt is a brain donor. Protocol in place (in [id number]) on what to do after patient has passed to ensure timely transport to [facility] to harvest brain. Robin Perkins servicing the family and will need to transport pt's body to [facility]. Pt's wife, Yaima Pittman, [place], is coping appropriately.         Signed by: Yao Muñoz

## 2018-03-22 NOTE — PROGRESS NOTES
Problem: Pain  Goal: *Control of Pain  Outcome: Progressing Towards Goal  Pt's pain has been manageable with Dilaudid iv. Agiattion managed with Haldol and newly initiated Ativan and Phenobarbital. No moaning, respirations non-labored, only some grimacing on turning noted.

## 2018-03-22 NOTE — PROGRESS NOTES
1900: Shift report received from Bri Johns, [address]: Pt in bed, laying in normal position, no grimacing noted, no moaning. Pt twitching intermittently. Sudden movement of upper extremities. Phenobarbital started today in pt's medicine regimen. Wife and wife's sister at the bedside. Pt's lungs clear. Abdomen sunken in and no bowel sounds audible. Medicated with scheduled meds. 2200: Scheduled meds administered. Pt appears comfortable. Episodes of apnea, shallow breathing. 0400: Medicated with scheduled meds, repositioned. Some twitching when moved, slight moaning. NAME OF PATIENT:  Paddy Frausto    LEVEL OF CARE:  GIP, changing to Routine LOC at midnight    REASON FOR GIP:   Pain, despite numerous changes in medications, Medication adjustment that must be monitored 24/7 and Stabilizing treatment that cannot take place at home    *PATIENT REMAINS ELIGIBLE FOR GIP LEVEL OF CARE AS EVIDENCED BY: (MUST BE ADDRESSED OF PATIENT GIP)    O2 SAFETY:  n/a    FALL INTERVENTIONS PROVIDED:   Implemented/recommended resources for alarm system (personal alarm, bed alarm, call bell, etc.) , Implemented/recommended environmental changes (remove hazards, lower bed, improve lighting, etc.) and Implemented/recommended increased supervision/assistance    INTERDISPLINARY COMMUNICATION/COLLABORATION:  Physician, MSW, [place] and RN, CNA    NEW MEDICATION INITIATION DOCUMENTATION:  no changes made on night shift    COMFORTABLE DYING MEASURE:  Is Patient/family satisfied with symptom level?  yes    DISCHARGE PLAN:  Most likely end of life at the [facility].

## 2018-03-22 NOTE — PROGRESS NOTES
NAME OF PATIENT:  Faiza Voss    LEVEL OF CARE:  Routine  O2 SAFETY:  On R/A at present    FALL INTERVENTIONS PROVIDED:   Implemented/recommended resources for alarm system (personal alarm, bed alarm, call bell, etc.)  and Implemented/recommended increased supervision/assistance    INTERDISPLINARY COMMUNICATION/COLLABORATION:  Physician, MSW, [place] and RN, CNA    NEW MEDICATION INITIATION DOCUMENTATION:  N/A at this time    Reason medication is being initiated:  N/A    MD / Provider name consulted re: change in status / initiation of new medication:  N/A      New Symptom(s):  N/A    New Order(s):  N/A    Name of the person notified of the changes:  N/A    Name of person being taught:  N/A    Instructions given:  N/A    Side Effects taught:  N/A    Response to teaching:  N/A      COMFORTABLE DYING MEASURE:  Is Patient/family satisfied with symptom level?  yes    DISCHARGE PLAN:  End of life  0700 Report from previous shift  0800 Resting with even unlabored respirations  Lungs CTA. Unresponsive to verbal stimuli but did grimace slightly with turning and positioning. Abdomen soft NT with normoactive BS. PPP HRR  No edema  Solis draining dark sedimented urine. Sacral DDI  R elbow with healing area. L heel with dark healing area. Temp up at 100.4 ax  Prn tylenol pr at this time. Scheduled pain medication given as per MARs. Will continue to monitor  1000  Resting quietly  No apparent distress with even unlabored respirations. Will continue to monitor  1115  Resting with even unlabored respirations. Wife in room. No apparent distress  1227  Scheduled medications given via R hand PIV. Remains without apparent distress. 1300  Wife in room. Client with even unlabored respirations. Facial muscles relaxed. Will continue to monitor  1500  Wife and sister have left. Client in room without apparent distress  1600  Scheduled medication given as per MARs. Assisted with turning to R side.   Client grimaced slightly with procedure. Will continue to monitor  1850  Scheduled medication given. Wife son and DIL present. They advised he tried to open eyes when son talking to him.   Appears comfortable will continue to monitor

## 2018-03-23 NOTE — PROGRESS NOTES
[facility]   Good Help to Those in Need  [phone]    Patient Name: Kevin Bustos  YOB: 1940    Date of Provider Hospice Visit: 03/23/18    Level of Care:   [] General Inpatient (GIP)    [x] Routine   [] Respite    Location of Care:  [] [facility] [] [facility] [] [address] [] [facility] [x] Hospice [facility]    Principle Hospice Diagnosis: Lewy Body Disease with dementia  Diagnoses RELATED to the terminal prognosis: atherosclerosis with CAD  Other Diagnoses: anxiety and depression        HOSPICE DIAGNOSES   Active Symptoms:  1. Lethargy/fatigue/weakness  2. Restlessness/agitation: Intermittent  3. Generalized pain: Nonverbal  4. Swallowing dysfunction/aspiration risk/inability to eat  5. Declining function  6. Myoclonic jerking     PLAN   1. Pt is routine level of care, more comfortable  2. Pt continues to require ongoing and frequent assessment and added valium for myoclonic jerking  3. Difficulty controlling agitation and pain, continues with myoclonic jerking, continues with non verbal pain and symptoms of agitation but have become less frequent    4. Continue Haldol 4mg IV q. 6 hours scheduled and continueprn haldol to 4mg every 2 hours as needed  5. Dilaudid 0.5mg every 15 min as needed and scheduled 1mg every 4 hours dosing as morphine may be causing myoclonic jerking   6. Added phenobarbital 30 mg q 8 hours and ativan 0.5 mg q 8 hours  for myoclonic jerking   7.  and SW to support family needs  8. Disposition: If patient survives routine care stay for 2-3 weeks and wife will consider moving him to the assisted living facility/nursing home    Prognosis estimated based on 03/23/18 clinical assessment is:   [x] Hours to Days    [] Days to Weeks    [] Other:    Communicated plan of care with: Hospice Case Manager; Hospice IDT; Care Team     GOALS OF CARE     Resuscitation Status: DNR  Durable DNR: [x] Yes [] No    No flowsheet data found.      HISTORY     History obtained from: chart, wife, hospice staff    CHIEF COMPLAINT: Not applicable  The patient is:   [] Verbal  [x] Nonverbal  [x] Unresponsive    HPI/SUBJECTIVE: Patient seen lethargic and   unresponsive. Wife at bedside. Met with spouse, she is definitely onboard with comfort care. Pt is transitioning. REVIEW OF SYSTEMS     The following systems were: [] reviewed  [x] unable to be reviewed      Adult Non-Verbal Pain Assessment Score: 6/10    Face  [] 0   No particular expression or smile  [] 1   Occasional grimace, tearing, frowning, wrinkled forehead  [x] 2   Frequent grimace, tearing, frowning, wrinkled forehead    Activity (movement)  [] 0   Lying quietly, normal position  [] 1   Seeking attention through movement or slow, cautious movement  [x] 2   Restless, excessive activity and/or withdrawal reflexes    Guarding  [] 0   Lying quietly, no positioning of hands over areas of body  [] 1   Splinting areas of the body, tense  [x] 2   Rigid, stiff    Physiology (vital signs)  [] 0   Stable vital signs  [x] 1   Change in any of the following: SBP > 20mm Hg; HR > 20/minute  [] 2   Change in any of the following: SBP > 30mm Hg; HR > 25/minute    Respiratory  [x] 0   Baseline RR/SpO2, compliant with ventilator  [] 1   RR > 10 above baseline, or 5% drop SpO2, mild asynchrony with ventilator  [] 2   RR > 20 above baseline, or 10% drop SpO2, asynchrony with ventilator     FUNCTIONAL ASSESSMENT     Palliative Performance Scale (PPS): 10%     PSYCHOSOCIAL/SPIRITUAL ASSESSMENT     Active Problems:    * No active hospital problems. *    No past medical history on file. No past surgical history on file. Social History   Substance Use Topics    Smoking status: Not on file    Smokeless tobacco: Not on file    Alcohol use Not on file     No family history on file.    Allergies   Allergen Reactions    Cephalexin Hives    Levofloxacin Unknown (comments)    Penicillin Unknown (comments)    Pravastatin Unknown (comments)      Current Facility-Administered Medications   Medication Dose Route Frequency    PHENobarbital (LUMINAL) injection 30 mg  30 mg IntraVENous Q8H    LORazepam (ATIVAN) injection 0.5 mg  0.5 mg IntraVENous Q8H    HYDROmorphone (PF) (DILAUDID) injection 0.5 mg  0.5 mg IntraVENous Q15MIN PRN    HYDROmorphone (PF) (DILAUDID) injection 1 mg  1 mg IntraVENous Q4H    haloperidol lactate (HALDOL) injection 4 mg  4 mg IntraVENous Q2H PRN    haloperidol lactate (HALDOL) injection 4 mg  4 mg IntraVENous Q6H    scopolamine (TRANSDERM-SCOP) 1 mg over 3 days 1 Patch  1 Patch TransDERmal Q72H    atropine 1 % ophthalmic solution 2 Drop  2 Drop SubLINGual Q2H PRN    bisacodyl (DULCOLAX) suppository 10 mg  10 mg Rectal DAILY PRN    acetaminophen (TYLENOL) suppository 650 mg  650 mg Rectal Q4H PRN        PHYSICAL EXAM     Wt Readings from Last 3 Encounters:   12/05/17 80.7 kg (178 lb)       Visit Vitals    /52 (BP 1 Location: Left arm, BP Patient Position: Lying right side)    Pulse 84    Temp 97.3 °F (36.3 °C)    Resp 15    SpO2 90%       Supplemental O2  [] Yes  [x] NO  Last bowel movement:     Currently this patient has:  [x] Peripheral IV [] PICC  [] PORT [] ICD    [] Solis Catheter [] NG Tube   [] PEG Tube    [] Rectal Tube [] Drain  [] Other:     Constitutional: obtunded, stiff, non verbal  Eyes: Closed  ENMT: Dry mucous membranes  Cardiovascular: Distant heart sounds  Respiratory: Nonlabored breathing, Cheyne Padgett   Gastrointestinal: Soft, nontender, bowel sounds present  Musculoskeletal: No contractures or deformities and no tenderness to palpation  Skin: No rashes or bruises, no edema, patient has a stage III sacral decub that is tender to palpation and has a necrotic exudate and slough attached to the base of the ulcer, no drainage and no odor.   Neurologic: Lethargic, minimally responsive, moving all extremities and having intermittent restlessness, myoclonic jerking  Psychiatric: N/A  Other:       Pertinent Lab and or Imaging Tests:  No results found for: NA, K, CL, CO2, AGAP, GLU, BUN, CREA, BUCR, GFRAA, GFRNA, CA, GFRAA  No results found for: TP, ALBR, TALB, ALB        Total time: 35 minutes  Counseling / coordination time: 20 minutes spent discussing with wife and hospice staff  > 50% counseling / coordination?:  Yes      Augusta Sharp NP

## 2018-03-23 NOTE — PROGRESS NOTES
0700  Report received. 0745  Pt lying in bed, unresponsive. No facial grimacing at this time. Lungs clear but diminished. No cough noted.  + bowel sounds. Last reported Bm was 3-14. Abd is soft and nontender. Solis is draining he urine. No edema noted. Skin is dry. Butt cheeks not assessed at this time. Pt has an IV in his right wrist.  Dressing is clear and intact. 800  Pt medicate with Scheduled Dilaudid. 1000  Pt snoring, No signs of distress. 1145  Family at the bedside. Rn educated on the status of Mr sheldon. 1300  Pt asleep. No signs of distress. 1407  Pt medicated with Pheno and Lorazepam.  Pt's sister at the bedside. Pt appears to be very comfortable. SW PN in to visit with the family. 1600  Pt resting comfortably. 1830  Pt coughing and having difficulty catching his breath. Pt medicated with Dilaudid and Haldol. 1850  Pt resting comfortably. 1900  Report given. NAME OF PATIENT:  Catherene Safe    LEVEL OF CARE:  Routine    REASON FOR GIP:  n/a    *PATIENT REMAINS ELIGIBLE FOR GIP LEVEL OF CARE AS EVIDENCED BY: (MUST BE ADDRESSED OF PATIENT GIP)  n/a      REASON FOR RESPITE: n/a    O2 SAFETY: RA    FALL INTERVENTIONS PROVIDED:   Implemented/recommended resources for alarm system (personal alarm, bed alarm, call bell, etc.)  and Implemented/recommended environmental changes (remove hazards, lower bed, improve lighting, etc.)    INTERDISPLINARY COMMUNICATION/COLLABORATION:  Physician, MSW, [place] and RN, CNA    NEW MEDICATION INITIATION DOCUMENTATION:  No new medications initiated.       Reason medication is being initiated:  n/a    MD / Provider name consulted re: change in status / initiation of new medication:  n/a    New Symptom(s):  n/a    New Order(s):  n/a    Name of the person notified of the changes:  n/a    Name of person being taught:  n/a    Instructions given:  n/a    Side Effects taught:  n/a    Response to teaching: n/a      COMFORTABLE DYING MEASURE:  Is Patient/family satisfied with symptom level?  yes    DISCHARGE PLAN:  Pt will remain at the [facility] for End of Life Care.

## 2018-03-23 NOTE — PROGRESS NOTES
NAME OF PATIENT:  Shay Aragon    LEVEL OF CARE:  Routine      O2 SAFETY:  On R/A at present    FALL INTERVENTIONS PROVIDED:   Implemented/recommended increased supervision/assistance    INTERDISPLINARY COMMUNICATION/COLLABORATION:  Physician, ODALYS, [place] and RN, CNA    NEW MEDICATION INITIATION DOCUMENTATION:  N/A at present    Reason medication is being initiated:  N/A    MD / Provider name consulted re: change in status / initiation of new medication:  N/A    New Symptom(s):  N/A    New Order(s):  N/A    Name of the person notified of the changes:  N/A    Name of person being taught:  N/A    Instructions given:  N/A    Side Effects taught:  N/A    Response to teaching:  N/A      COMFORTABLE DYING MEASURE:  Is Patient/family satisfied with symptom level?  yes    DISCHARGE PLAN:  End of life  35770 Report from previous shift  1957 Resting without signs of distress. Scheduled medications given. Respirations even unlabored. Lungs CTA on R/A  PPP HRR No edema. Abdomen with hypoactive BS  Extremities warm. No attempt to open eyes. No response during assessment. Wife advises she is going home. Advised I would call with any changes  2114  Resting with even unlabored respirations. Facial muscles relaxed. Will continue to monitor  2137  Scheduled medications given. Client without apparent distress. Mouth care provided and client did suck on sponge. Turned and positioned to L side. Will continue to monitor  2330 Resting with even unlabored respirations. No apparent distress will continue to monitor  0015  Scheduled medications given. Mouth care provided. Client with even unlabored respiration. Short periods of apnea noted. Facial muscles relaxed. Moaned slightly s/p mouth care. Solis with minimal output  0049  Course cough heard from hallway. Atropine sl given to aid secretions  0200  Resting without apparent distress.   Will continue to monitor  6986  Scheduled medications given,  Dressing to sacrum changed-malodorous yellow drainage  Sacrum with open area tunneling 2 mm diameter opening red tissue under. L buttocks open area with pink tissue at base now about 1/2 inch diameter stage 3. Bed and bath completed  0600 Scheduled medications given. Client without distress even unlabored respirations, facial muscles relaxed, no noted muscle jerking this shift.

## 2018-03-23 NOTE — PROGRESS NOTES
[facility] SW/CH VISIT     BS Hospice Chaplain Follow-up PRN visit     The staff informed me that anne, the wife of the patient requested I visit when I returned to the [facility]. I visited the room and found her with the patient. He was in bed, unresponsive, breathing via open mouth and displaying a regular breath pattern. He was slightly restless but appeared well medicated and comfortable. He appeared more drawn and in a deeper state of unconsciousness than during my visit yesterday. One of his sons is arriving from [facility] later today and Eloise Gonsalez feels that the patient is waiting for him before he passes. His daughter-in-law is arriving from [facility] tomorrow. I continued my ministry of presence, active listening, validation of emotions, normalization, offered words of comfort, and spiritual encouragement. Manjinder Betancourt, her sister arrived and Eloise Robertns said she and Manjinder Betancourt had decided to have a [place] of Life Service for the patient this Sunday at [address], whether he had passed or not. They asked me to officiate and I agreed. We spent the rest of the visit discussing the service, mechanics and content. I will begin an Order of Service and meet with them tomorrow. Both thanked me. GOALS:  Begin to prepare an Order of Service for this patient. Continue to visit this patient and his family, to provide pastoral care and spiritual support to assist both the patient and them with coping with this decline. Build trust and familiarity with the family to encourage a discussion of their spiritual thoughts and concerns at a deeper and more personal level if they so choose. Validate the emotions and normalize the anticipatory grief of the family regarding this declining situation. Offer written spiritual material to the family as needed or requested and provide a listening presence when needed.    PLAN:   Continue to visit this patient and his family, while he is @ [facility] and I am in this facility, or PRN as requested. Coordinate with Care Team on POC.  VISIT FREQUENCY:   1 wk.2 starting 3/15/18  plus 4 prn 14 days. Tyler Pepper, [address]   [phone]    Addendum:   This note covered a visit made on 3/22 from 14:50 until 1600

## 2018-03-24 NOTE — PROGRESS NOTES
0700- Received report from [address]  utilizing SBAR, I/O and Kardex  Patient was admitted to the [facility]   Level of care   Hospice Diagnosis- LB Dementia  Other History includes-     0730 Assessment completed   Neuro- responds to stimulus  Muscular-moves all extremities, rigid, guarding  Resp-even unlabored, lungs sounds diminished  Oxygen - room air  Cardio-Heart regular, pulses palpable, skin warm and dry, cap refill less than 3 sec, distal extremities warm, temp 100.2 axillary  GI- Abd soft, nondistended, nontender, bowel sounds hypoactive, incontinent of bowels,   Diet - NPO  Last BM - 3/13  -incontinent of bladder   Solis in place draining   Skin/Wounds - buttocks area x 2  Edema- none  Access sites- R FA 22 g  Family- wife     0730 Resting quietly, scheduled Haldol via IVP at this time, mouth care done, vitals taken   1400 Scheduled meds at 07 08 12 13 and 14 tolerated well, new IV started due to phenobarbital  1430 Increased secretions requested robinul from MD and med was given.   1500 Oral suctioning for secretions   1600 Scheduled dilaudid IVP flushed, several visitors at bedside  1800 Resting quietly secretions controlled  1900 report to oncoming shift.       NAME OF PATIENT:  Eleni Clause     LEVEL OF CARE:  Routine        O2 SAFETY:  On R/A at present     FALL INTERVENTIONS PROVIDED:   Implemented/recommended increased supervision/assistance     INTERDISPLINARY COMMUNICATION/COLLABORATION:  Physician, MSW, [place] and RN, CNA     NEW MEDICATION INITIATION DOCUMENTATION:  N/A at present     Reason medication is being initiated:  N/A     MD / Provider name consulted re: change in status / initiation of new medication:  N/A     New Symptom(s):  N/A     New Order(s):  N/A     Name of the person notified of the changes:  N/A     Name of person being taught:  N/A     Instructions given:  N/A     Side Effects taught:  N/A     Response to teaching:  N/A        COMFORTABLE DYING MEASURE:  Is Patient/family satisfied with symptom level?  yes     DISCHARGE PLAN:  End of life

## 2018-03-24 NOTE — PROGRESS NOTES
Problem: Comfort Deficit  Goal: Reduce/control pain  Patient will report that pain has been reduced or controlled through verbal and nonverbal means and that measures to promote comfort are effective. Outcome: Progressing Towards Goal  Scheduled and prn medications to prevent distress.   Positioning to aid comfort

## 2018-03-24 NOTE — PROGRESS NOTES
[facility] SW[facility]CH VISIT      BS Hospice Chaplain Follow-up PRN visit      I met with Sixto Quevedo, the wife of the patient and her sister, Nadir Velasco in the room of the patient . The patient continued at the visible level at which in which I found yesterday during my visit yesterday. Sixto Quevedo has \"no idea what he is waiting for. \"  He appeared comfortable and was breathing in a normal pattern. He was unresponsive. Sixto Quevedo again noted that the family continues to gather in [place] for the [facility] of [employer]. I presented her with a proposed order of service and a draft of a service including prayers, readings, and format. She provided me with the various pieces of music which her friend, Karolina Wilhelm, will play the tunes during the service. The family is primarily focused on music. I continued my ministry of presence, active listening, validation of emotions, normalization, offered words of comfort, and spiritual encouragement.    I will finish the service tonight and e-mail her a copy for approval.  I will also prepare an alternate service in case the patient passes before 14:00 tomorrow. GOALS:  Prepare the celebration of life service, as well as an alternative  service for this patient. Continue to visit this patient and his family, to provide pastoral care and spiritual support to assist both the patient and them with coping with this decline. Build trust and familiarity with the family to encourage a discussion of their spiritual thoughts and concerns at a deeper and more personal level if they so choose. Validate the emotions and normalize the anticipatory grief of the family regarding this declining situation. Offer written spiritual material to the family as needed or requested and provide a listening presence when needed. PLAN:   Continue to visit this patient and his family, while he is @ [facility] and I am in this facility, or PRN as requested. Coordinate with Care Team on POC.     VISIT FREQUENCY:   1 wk.2 starting 3/15/18  plus 4 prn 14 days.    Tyler Pepper, [address]   [phone]

## 2018-03-24 NOTE — PROGRESS NOTES
[facility] SW/CH VISIT      BS Hospice Chaplain Follow-up PRN visit      I met with Nighat Nixon, the wife of the patient, her step-son, Mariluz Fabian, and her sister, Ulysses Reddy in the room of the patient and found her with the patient. The patient was in bed, unresponsive, displaying a regular, soft breath pattern. He was slightly restless but appeared well medicated and comfortable. He appeared more drawn and in a deeper state of unconsciousness than during my visit yesterday. Nighat Nixon again noted that the family continues to gather in [place] and they are planning to have a [place] of Life for the patient at [facility] on , 3/26. If he passes before then, the service will also include more traditional  aspects. They have asked me to officiate at this service and I have agreed. Today, we spoke about the content and mechanics of this service. The family is primarily focused on music, which will be played my a close friend of the family. I suggested a general format and several possible passages. I continued my ministry of presence, active listening, validation of emotions, normalization, offered words of comfort, and spiritual encouragement.    I will begin to refine an Order of Service and meet with them tomorrow. The family tomorrow. GOALS:  Begin to prepare an Order of Service for this patient. Continue to visit this patient and his family, to provide pastoral care and spiritual support to assist both the patient and them with coping with this decline. Build trust and familiarity with the family to encourage a discussion of their spiritual thoughts and concerns at a deeper and more personal level if they so choose. Validate the emotions and normalize the anticipatory grief of the family regarding this declining situation. Offer written spiritual material to the family as needed or requested and provide a listening presence when needed.    PLAN:   Continue to visit this patient and his family, while he is @ [facility] and I am in this facility, or PRN as requested. Coordinate with Care Team on POC.  VISIT FREQUENCY:   1 wk.2 starting 3/15/18  plus 4 prn 14 days.    Khurram Banks, [address]   [phone]

## 2018-03-24 NOTE — PROGRESS NOTES
Problem: Pressure Injury - Risk of  Goal: *Prevention of pressure ulcer  Outcome: Progressing Towards Goal  Turning and positioning to prevent further tissue injury.   Poor nutrition and perfusion 2nd to dying process

## 2018-03-25 NOTE — PROGRESS NOTES
0700- Received report from [employer], I/O and Kardex  Patient was admitted to the [facility]   Hospice Diagnosis- LB Dementia  Other History includes-      0730 Assessment completed   Neuro- responds to stimulus  Muscular-moves all extremities, rigid, guarding  Resp-even unlabored, lungs sounds diminished  Oxygen - room air  Cardio-Heart regular, pulses palpable, skin warm and dry, cap refill less than 3 sec, distal extremities warm, temp 100.2 axillary  GI- Abd soft, nondistended, nontender, bowel sounds hypoactive, incontinent of bowels,   Diet - NPO  Last BM - 3/13  -incontinent of bladder   Solis in place draining   Skin/Wounds - buttocks area x 2  Edema- none  Access sites- R FA 22 g, LFA 20g  Family- wife      0800 Scheduled meds  0900 Turned and repositioned, mouthcare   1300 Constant coughing weak cough, suctioned secretions, mouthcare  1330 Scheduled meds at this time      NAME OF PATIENT: Guilherme Xiao      LEVEL OF CARE:  Routine          O2 SAFETY:  On R/A at present      FALL INTERVENTIONS PROVIDED:   Implemented/recommended increased supervision/assistance      INTERDISPLINARY COMMUNICATION/COLLABORATION:  Physician, MSW, [place] and RN, CNA      NEW MEDICATION INITIATION DOCUMENTATION:  N/A at present      Reason medication is being initiated:  N/A      MD / Provider name consulted re: change in status / initiation of new medication:  N/A      New Symptom(s):  N/A      New Order(s):  N/A      Name of the person notified of the changes:  N/A      Name of person being taught:  N/A      Instructions given:  N/A      Side Effects taught:  N/A      Response to teaching:  N/A          COMFORTABLE DYING MEASURE:  Is Patient/family satisfied with symptom level?  yes      DISCHARGE PLAN:  End of life

## 2018-03-25 NOTE — PROGRESS NOTES
1900 Report received from [facility]. Pt is Routine level of care. Dx Lewi Body Dementia. 1 Pts wife, sister, and other family are at the bedside. Pt is unresponsive to tactile or verbal stimuli. Hands and feet are cool to touch but no mottling noted. Respirations are slow at 8 to 10 per minute and shallow. No secretions audible. Wife appears very tired. She is sitting by the bedside holding the pts hand. IV sites patent right and left wrists. Solis cath patent with small amt he urine in the tubing. 2130 Family have all gone home. Pt given scheduled medications and repositioned to his right side. He makes no grimace with repositioning. 0330 Complete bath given. Pt does not respond to any stimulus. Repositioned to left side. Dressing on sacral area dry and intact. NAME OF PATIENT:  Keyonna Stagepatrica    LEVEL OF CARE:  Routine    REASON FOR GIP:   na    *PATIENT REMAINS ELIGIBLE FOR GIP LEVEL OF CARE AS EVIDENCED BY: (MUST BE ADDRESSED OF PATIENT GIP)  na    REASON FOR RESPITE:  na    O2 SAFETY:  na    FALL INTERVENTIONS PROVIDED:   Implemented/recommended use of fall risk identification flag to all team members and Implemented/recommended increased supervision/assistance    INTERDISPLINARY COMMUNICATION/COLLABORATION:  Physician, ODALYS, [place] and RN, CNA    NEW MEDICATION INITIATION DOCUMENTATION:  Documentation completed in Clinical Note in [address] [facility]    Reason medication is being initiated:  dylan    MD / Provider name consulted re: change in status / initiation of new medication:  na    New Symptom(s):  na    New Order(s):  na    Name of the person notified of the changes:  na    Name of person being taught:  na    Instructions given:  na    Side Effects taught:  na    Response to teaching:  na      COMFORTABLE DYING MEASURE:  Is Patient/family satisfied with symptom level? Yes    DISCHARGE PLAN:  End of life at [facility]. Pt is transitioning.

## 2018-03-26 NOTE — PROGRESS NOTES
1900 Report received from [facility]. Pt is Routine level of care. Dx Lewi Body Dementia. 1 Family have just arrived and at the bedside. Wife is relieved that [facility] service is over and speaks gratitude for [place]. Pt is resting quietly, unresponsive to verbal or tactile stimuli. Respirations slow at 6/min and shallow. Secretions are less since medication. 2100 Wife is going home now. Other visitors in the room. 65 All family have gone. Pt repositioned to left side. Pt has no signs of discomfort. 0040 Robinul given for increased secretions. 0hallow. Secretions decreased. 500 Complete bath given. Pt repositioned to right side for comfort. Sacral wound with odor and drainage. Wound cleansed and clean dressing applied. Resp s    NAME OF PATIENT:  Milli Shaffer    LEVEL OF CARE:  Routine    REASON FOR GIP:   na    *PATIENT REMAINS ELIGIBLE FOR [facility] LEVEL OF CARE AS EVIDENCED BY: (MUST BE ADDRESSED OF PATIENT GIP)      REASON FOR RESPITE:  na    O2 SAFETY:  na    FALL INTERVENTIONS PROVIDED:   Implemented/recommended increased supervision/assistance    INTERDISPLINARY COMMUNICATION/COLLABORATION:  Physician, MSW, [place] and RN, CNA    NEW MEDICATION INITIATION DOCUMENTATION:  Documentation completed in Clinical Note in [facility]    Reason medication is being initiated:  dylan    MD / Provider name consulted re: change in status / initiation of new medication:  na    New Symptom(s):  na    New Order(s):  na    Name of the person notified of the changes:  na    Name of person being taught:  na    Instructions given:  na  Side Effects taught:  na    Response to teaching:  na    COMFORTABLE DYING MEASURE:  Is Patient/family satisfied with symptom level? Yes    DISCHARGE PLAN:  Pt is transitioning, Remain at [address]..

## 2018-03-26 NOTE — PROGRESS NOTES
Arie visit    [facility] VISIT      BS Hospice Chaplain [place] of Life Service     I met with the Jillian Rodriguez and the pianist who would be playing at the service to discuss the final format and content of the service at the [facility]. After the details were completed, I met the extended family. I then officiated at the service. There were approximately 200 people at this service. After the service, Libby Frausto requested I come to the reception at the [facility] at the [facility] in which she and Nabila Escalera live. She continued to express great appreciation for the care and the support which she and her family had and continued to receive at the [facility]. GOALS:  Continue to visit this patient and his family, to provide pastoral care and spiritual support to assist both the patient and them with coping with the patients decline. Build trust and familiarity with the family to encourage a discussion of their spiritual thoughts and concerns at a deeper and more personal level if they so choose. Validate the emotions and normalize the anticipatory grief of the family regarding this declining situation. PLAN:   Continue to visit this patient and his family, while he is @ [facility] and I am in this facility, or PRN as requested. Coordinate with Care Team on POC.  VISIT FREQUENCY:   1 wk.2 starting 3/15/18  plus 4 prn 14 days.    Tre Valiente, [address]    [phone]

## 2018-03-26 NOTE — PROGRESS NOTES
0700- Received report from [employer], I/O and Kardex  Patient was admitted to the [facility]   Hospice Diagnosis- LB Dementia      0730 Assessment completed   Neuro- responds to stimulus  Muscular-moves all extremities, rigid, guarding  Resp-even unlabored, lungs sounds diminished  Oxygen - room air  Cardio-Heart regular, pulses palpable, skin warm and dry, cap refill less than 3 sec, distal extremities warm, temp 100.2 axillary  GI- Abd soft, nondistended, nontender, bowel sounds hypoactive, incontinent of bowels,   Diet - NPO  Last BM - 3/13  -incontinent of bladder   Solis in place draining   Skin/Wounds - buttocks area x 2  Edema- none  Access sites- R FA 22 g, LFA 20g  Family- wife      0800 Scheduled meds via IVP, lethargic comfortable  0900 Turned and repositioned, wife at bedside, mouth care with suction  1200 Scheduled dilaudid via IVP with scheduled haldol  1400 Scheduled ativan and phenobarb 30 mg given pt is comfortable  1430 Wound care performed see flowsheet  1600 Scheduled dilaudid at this time, pt is comfortable  1800 resting quietly. 1900 Report to oncoming shift, pt comfortable no prns used this shift, responds to tactile stimuli with mouthcare, sponge/suction used due to pt easily chokes.  Pt is continuing to decline as evidenced by dropping blood pressure and lethargy.         NAME OF PATIENT: Ryan Dc  LEVEL OF CARE:  Routine       O2 SAFETY:  On R/A at present      FALL INTERVENTIONS PROVIDED:   Implemented/recommended increased supervision/assistance      INTERDISPLINARY COMMUNICATION/COLLABORATION:  Physician, MSW, [place] and RN, CNA      NEW MEDICATION INITIATION DOCUMENTATION:  N/A at present      Reason medication is being initiated:  N/A      MD / Provider name consulted re: change in status / initiation of new medication:  N/A      New Symptom(s):  N/A      New Order(s):  N/A      Name of the person notified of the changes:  N/A      Name of person being taught:  N/A      Instructions given:  N/A      Side Effects taught:  N/A      Response to teaching:  N/A          COMFORTABLE DYING MEASURE:  Is Patient/family satisfied with symptom level?  yes      DISCHARGE PLAN:  End of life

## 2018-03-26 NOTE — PROGRESS NOTES
Problem: Pressure Injury - Risk of  Goal: *Prevention of pressure ulcer  Outcome: Progressing Towards Goal  Stage 2 sacral pressure ulcer not improving due to decline of patient and no nutrition, pt is turned side to side to prevent further pressure    Problem: Patient Education: Go to Patient Education Activity  Goal: Patient/Family Education  Outcome: Progressing Towards Goal  Ongoing education with family on EOL signs and symptoms    Problem: Falls - Risk of  Goal: *Absence of Falls  Document Naida Fall Risk and appropriate interventions in the flowsheet. Fall Risk Interventions:       Mentation Interventions: Bed/chair exit alarm    Medication Interventions: Bed/chair exit alarm    Elimination Interventions: Bed/chair exit alarm    History of Falls Interventions: Bed/chair exit alarm        Problem: Comfort Deficit  Goal: Reduce/control pain  Patient will report that pain has been reduced or controlled through verbal and nonverbal means and that measures to promote comfort are effective. Outcome: Progressing Towards Goal  Pt pain and agitation well managed    Problem: Pain  Goal: *Control of Pain  Outcome: Progressing Towards Goal  Well managed    Problem: Risk for Falls  Goal: Free of falls during episode of care  Patient will be free of falls during episode of care. Outcome: Progressing Towards Goal  Pt is completely immobile and not a fall risk at this time    Problem: Alteration in Mobility  Goal: Remain as independent as possible and remain safe in environment  Patient will remain as independent as possible and remain safe in their environment. Outcome: Progressing Towards Goal  Pt will remain immobile until EOL    Problem: Comfort Deficit  Goal: Reduce/control pain  Patient will report that pain has been reduced or controlled through verbal and nonverbal means and that measures to promote comfort are effective.    Outcome: Progressing Towards Goal  Pain is well managed    Problem: Pain  Goal: Verbalize satisfaction of level of comfort and symptom control  Outcome: Progressing Towards Goal  POA is satisfied with pain management    Problem: Anticipatory Grief  Goal: Explore reactions to and verbalize acceptance of impending loss  Patient/family/caregiver will explore reactions to and verbalize acceptance of impending loss. Outcome: Progressing Towards Goal  Wife grief response  appropriate, has had living [facility] service yesterday. Problem: End of Life Process  Goal: Demonstrate understanding of end of life processes  Patient/caregiver will understand end of life processes.    Outcome: Progressing Towards Goal  Caregiver understands EOL process, anxious that brain donation process is followed and carried out upon pt death

## 2018-03-27 NOTE — PROGRESS NOTES
NAME OF PATIENT:  Shay Aragon    LEVEL OF CARE:  Routine  O2 SAFETY:  On R/A at present    FALL INTERVENTIONS PROVIDED:   Implemented/recommended increased supervision/assistance    INTERDISPLINARY COMMUNICATION/COLLABORATION:  Physician, MSW, [place] and RN, CNA    NEW MEDICATION INITIATION DOCUMENTATION:  N/A at this time    Reason medication is being initiated:  N/A    MD / Provider name consulted re: change in status / initiation of new medication:  N/A    New Symptom(s):  N/A    New Order(s):  N/A    Name of the person notified of the changes:  N/A    Name of person being taught:  N/A    Instructions given:  N/A    Side Effects taught:  N/A    Response to teaching:  N/A      COMFORTABLE DYING MEASURE:  Is Patient/family satisfied with symptom level?  yes    DISCHARGE PLAN:  End of life  0700  Report from previous shift  0744  Resting without signs of distress. Eyes closed without response during assessment. Lungs CTA with even unlabored respirations. BS normoactive soft non tender abdomen. PPP HRR no edema. Scheduled medication given  Will continue to monitor  0900 Resting without apparent distress  1000  Wife present wanted to know if Dr Paul Diamond would be in today and advised I was not sure. She would like a call if Dr Paul Diamond does come in. Notes client breathing about 7 times per minute. She plans to go to work today but I will call her and her work is aware she may have to leave quickly. Will continue to monitor  1100  No apparent distress  1200 Scheduled medication given  Short period of apnea noted  1300 Scheduled medication given  No apparent distress  1400  Scheduled medications given. Client with unlabored RR at 12/min  Facial muscles relaxed. Will continue to monitor  1450  L PIV leaking. Removed and started L AC PIV x 1 attempt.   Changed sacral dressing-area-red and purple with Deep tissue injury appearance  Sacral area with yellow drainage foul smelling  Will continue to monitor  1549 Scheduled medication given. Client remains without signs of distress  1700 Resting without signs of distress  1800  Respirations unlabored but apnic periods up to 20 sec noted. Client with relaxed facial muscles  1830  Wife in s/p work. Educated Dr Lefty Bertrand did not see any changes from yesterday. Educated about continued apnic periods.   Will continue to monitor

## 2018-03-27 NOTE — PROGRESS NOTES
Problem: Comfort Deficit  Goal: Reduce/control pain  Patient will report that pain has been reduced or controlled through verbal and nonverbal means and that measures to promote comfort are effective. Outcome: Progressing Towards Goal  Scheduled medications today preventing distress    Problem: End of Life Process  Goal: Demonstrate understanding of end of life processes  Patient/caregiver will understand end of life processes. Outcome: Progressing Towards Goal  Wife aware client is in end of life process and coping.   Today was her first day back to work

## 2018-03-27 NOTE — ROUTINE PROCESS
[facility] SW/CH VISIT      [facility]ospice Chaplain Follow-up PRN visit     I visited the room of this patient who is @ [facility] now on routine status. He was in bed, unresponsive, breathing via open mouth and displaying a regular breath pattern. He was slightly restless but appeared well medicated and comfortable. There was no family present. I had officiated at a [place] of Life for the patient on March 25 at [facility] with approximately 200 people in attendance. I learned during my visits with the family that the patient was \"spiritual' but did not attend a traditional Spiritism on a regular basis.  I read Scripture and Psalms to him, offered words of comfort, assurance, and Spiritual encouragement. GOALS:  Continue to visit this patient and his family, to provide pastoral care and spiritual support to assist both the patient and them with coping with this decline. Build trust and familiarity with the family to encourage a discussion of their spiritual thoughts and concerns at a deeper and more personal level if they so choose. Validate the emotions and normalize the anticipatory grief of the family regarding this declining situation. Offer written spiritual material to the family as needed or requested and provide a listening presence when needed. PLAN:   Continue to visit this patient and his family, while he is @ [facility] and I am in this facility, or PRN as requested. Coordinate with Care Team on POC.  VISIT FREQUENCY:   1 wk.2 starting 3/15/18  plus 4 prn 14 days.    Lindsay Acosta, [address] [place]    [phone]

## 2018-03-27 NOTE — PROGRESS NOTES
1900: Shift report received from [facility] [address] [facility] [address]: Pt in bed, unresponsive, breathing regular and non-labored with clear lungs. No grimacing, no moaning. Pt appears comfortable. HR 92, resp 12/min. Wife at the bedside, speaking about the Service that was held in his honor yesterday. Wife very pleased with how it turned out and that it was \"everything she wanted it to be\". 2210: Medicated with scheduled meds. No change. 0400: Scheduled meds administered. Bath given. Dressing on buttocks changed. 0630: Scheduled meds administered. Pt appears comfortable. NAME OF PATIENT:  Marnepatrizia Stager    LEVEL OF CARE:  Routine    O2 SAFETY:  n/a    FALL INTERVENTIONS PROVIDED:   Implemented/recommended assistive devices and encouraged their use, Implemented/recommended resources for alarm system (personal alarm, bed alarm, call bell, etc.)  and Implemented/recommended environmental changes (remove hazards, lower bed, improve lighting, etc.)    INTERDISPLINARY COMMUNICATION/COLLABORATION:  Physician, MSW, [place] and RN, CNA    NEW MEDICATION INITIATION DOCUMENTATION:  no chnages made on this shift    COMFORTABLE DYING MEASURE:  Is Patient/family satisfied with symptom level?  yes    DISCHARGE PLAN:  End of life at the [facility].

## 2018-03-28 NOTE — [facility] IDG MASTER NOTE
Patient: Zafar Jang    Date: 03/28/18  Time: 6:32 PM  GOALS:  Continue to visit this patient and his family, to provide pastoral care and spiritual support to assist both the patient and them with coping with this decline. Build trust and familiarity with the family to encourage a discussion of their spiritual thoughts and concerns at a deeper and more personal level if they so choose. Validate the emotions and normalize the anticipatory grief of the family regarding this declining situation. Offer written spiritual material to the family as needed or requested and provide a listening presence when needed. PLAN:   Continue to visit this patient and his family, while he is @ [facility] and I am in this facility, or PRN as requested. Coordinate with Care Team on POC.  VISIT FREQUENCY:   1 wk.1,2 starting 3/15/18  plus 4 prn 14 days. Aline Remy MTS,[facility]  [facility]     [phone]      Signed by: Aline Remy         [facility]  Date: 03/28/18  Time: 6:31 PM    ___________________    Patient: Zafar Jang  Insurance ID: [unfilled]  MRN: [id number]  CCN:   [place] Claim No. :     Hospice Election Date:  Current Benefit Period:  Current Benefit Period Start Date:    Medical Director:   Hospice Attending Provider    ___________________    Diagnoses:  Diagnoses of Lewy body dementia with behavioral disturbance, Restlessness and agitation, Fatigue, unspecified type, and Diffuse pain were pertinent to this visit.     Current Medications:    Current Facility-Administered Medications:     glycopyrrolate (ROBINUL) injection 0.2 mg, 0.2 mg, IntraVENous, Q4H PRN, Anatoliy Prabhakar MD, 0.2 mg at 03/26/18 0039    PHENobarbital (LUMINAL) injection 30 mg, 30 mg, IntraVENous, Q8H, Sasha Macdonald NP, 30 mg at 03/28/18 1446    LORazepam (ATIVAN) injection 0.5 mg, 0.5 mg, IntraVENous, Q8H, Sasha Macdonald NP, 0.5 mg at 03/28/18 1445    HYDROmorphone (PF) (DILAUDID) injection 0.5 mg, 0.5 mg, IntraVENous, Q15MIN PRN, Gulshan Magaña, NP, 0.5 mg at 03/25/18 1321    HYDROmorphone (PF) (DILAUDID) injection 1 mg, 1 mg, IntraVENous, Q4H, Sasha Macodnald NP, 1 mg at 03/28/18 1751    haloperidol lactate (HALDOL) injection 4 mg, 4 mg, IntraVENous, Q2H PRN, Gulshan Macdonald NP, 4 mg at 03/25/18 7488    haloperidol lactate (HALDOL) injection 4 mg, 4 mg, IntraVENous, Q6H, Sasha Macdonald NP, 4 mg at 03/28/18 1228    scopolamine (TRANSDERM-SCOP) 1 mg over 3 days 1 Patch, 1 Patch, TransDERmal, Q72H, Daniel Clements, NP, 1 Patch at 03/26/18 0800    atropine 1 % ophthalmic solution 2 Drop, 2 Drop, SubLINGual, Q2H PRN, Daniel Clements, DIO, 2 Drop at 03/25/18 1619    bisacodyl (DULCOLAX) suppository 10 mg, 10 mg, Rectal, DAILY PRN, Teodoro Cunningham MD    acetaminophen (TYLENOL) suppository 650 mg, 650 mg, Rectal, Q4H PRN, Teodoro Cunningham MD    Orders:  Orders Placed This Encounter    DIET NPO     Standing Status:   Standing     Number of Occurrences:   1    NURSING-MISCELLANEOUS: (see comments) 1. NO admission labs, x-rays or other diagnostic tests, unless pertinent to symptom control . 2. Discontinue ALL prior medications. CONTINUOUS     1. NO admission labs, x-rays or other diagnostic tests, unless pertinent to symptom control . 2. Discontinue ALL prior medications.      Standing Status:   Standing     Number of Occurrences:   1     Order Specific Question:   Description of Order:     Answer:   (see comments)    COMFORT MEASURES ONLY     Standing Status:   Standing     Number of Occurrences:   1    VITAL SIGNS     Standing Status:   Standing     Number of Occurrences:   1    NOTIFY PROVIDER: SPECIFY NOTIFY PROVIDER: FOR PAIN, DYSPNEA, AGITATION, OTHER DISTRESS OR NOT RESPONDING TO ORDERED INTERVENTIONS CONTINUOUS Routine     Standing Status:   Standing     Number of Occurrences:   1     Order Specific Question:   Please describe the test or procedure you would like to order. Answer:   NOTIFY PROVIDER: FOR PAIN, DYSPNEA, AGITATION, OTHER DISTRESS OR NOT RESPONDING TO ORDERED INTERVENTIONS    ORAL CARE     Keep mouth moisturized with sponge sticks/toothettes and tap water. Vaseline to lips and nares as needed. Standing Status:   Standing     Number of Occurrences:   1    BEDREST, COMPLETE     Standing Status:   Standing     Number of Occurrences:   1    TURN & POSITION     TURN & POSITION EVERY 6 HOURS - PATIENT MAY REFUSE     Standing Status:   Standing     Number of Occurrences:   1    WOUND CARE, DRESSING CHANGE     Cleanse sacral wound with NS  Cover open area with petroleum gauze cover with ABD and secure. Change dressing every other day and prn for drainage or if client has BM     Standing Status:   Standing     Number of Occurrences:   1    NURSING-MISCELLANEOUS: Admit to GIP LOC; SN visit daily x 14 days with 5 visits PRN for symptom control; SW visit 2 x weekly and 5 visits PRN family support;  1 x weekly and 5 visits PRN spiritual support; CNA daily x 14 days. CONTINUOUS     Standing Status:   Standing     Number of Occurrences:   1     Order Specific Question:   Description of Order:     Answer:   Admit to GIP LOC; SN visit daily x 14 days with 5 visits PRN for symptom control; SW visit 2 x weekly and 5 visits PRN family support;  1 x weekly and 5 visits PRN spiritual support; CNA daily x 14 days.  DO NOT RESUSCITATE     Standing Status:   Standing     Number of Occurrences:   1    OXYGEN CANNULA Liters per minute: 2; Indications for O2 therapy: OTHER PRN Routine     Oxygen as needed. Adjust for comfort. Discontinue if not contributing to patient comfort. Standing Status:   Standing     Number of Occurrences:   [id number]     Order Specific Question:   Liters per minute:      Answer:   2     Order Specific Question:   Indications for O2 therapy     Answer:   OTHER    bisacodyl (DULCOLAX) suppository 10 mg    DISCONTD: acetaminophen (TYLENOL) tablet 650 mg    acetaminophen (TYLENOL) suppository 650 mg    DISCONTD: busPIRone (BUSPAR) tablet 10 mg    DISCONTD: ciprofloxacin HCl (CIPRO) tablet 500 mg (Patient Supplied)     Order Specific Question:   Specific disease being treated with this drug.      Answer:   wound infection     Order Specific Question:   Is this requested medication unique in class, structure or indication     Answer:   No     Order Specific Question:   Reasons for patient to receive nonformulary medication     Answer:   Taking    DISCONTD: LORazepam (INTENSOL) 2 mg/mL oral concentrate 0.5 mg    DISCONTD: LORazepam (ATIVAN) tablet 0.5 mg    DISCONTD: morphine (ROXANOL) 100 mg/5 mL (20 mg/mL) concentrated solution 5 mg    DISCONTD: morphine (ROXANOL) 100 mg/5 mL (20 mg/mL) concentrated solution 5 mg    DISCONTD: docusate sodium (COLACE) capsule 100 mg    DISCONTD: dutasteride (AVODART) capsule 0.5 mg    DISCONTD: gabapentin (NEURONTIN) capsule 200 mg    DISCONTD: haloperidol (HALDOL) tablet 0.5 mg    DISCONTD: ibuprofen (MOTRIN) tablet 200 mg    DISCONTD: QUEtiapine (SEROquel) tablet 75 mg    DISCONTD: scopolamine (TRANSDERM-SCOP) 1 mg over 3 days 1 Patch    DISCONTD: sertraline (ZOLOFT) tablet 150 mg    DISCONTD: tamsulosin (FLOMAX) capsule 0.4 mg    DISCONTD: traZODone (DESYREL) tablet 50 mg    DISCONTD: haloperidol (HALDOL) tablet 0.5 mg    DISCONTD: haloperidol (HALDOL) 2 mg/mL oral solution 2 mg    scopolamine (TRANSDERM-SCOP) 1 mg over 3 days 1 Patch    atropine 1 % ophthalmic solution 2 Drop    DISCONTD: morphine (ROXANOL) 100 mg/5 mL (20 mg/mL) concentrated solution 5 mg    DISCONTD: haloperidol (HALDOL) 2 mg/mL oral solution 1 mg    DISCONTD: haloperidol (HALDOL) 2 mg/mL oral solution 1 mg    DISCONTD: morphine injection 2 mg    haloperidol lactate (HALDOL) injection 5 mg    DISCONTD: haloperidol lactate (HALDOL) injection 2.5 mg    DISCONTD: morphine injection 2 mg    DISCONTD: morphine injection 2 mg    DISCONTD: morphine injection 2 mg    DISCONTD: haloperidol lactate (HALDOL) injection 4 mg    DISCONTD: morphine injection 4 mg    haloperidol lactate (HALDOL) injection 4 mg    HYDROmorphone (PF) (DILAUDID) injection 0.5 mg    HYDROmorphone (PF) (DILAUDID) injection 1 mg    haloperidol lactate (HALDOL) injection 4 mg    DISCONTD: diazePAM (VALIUM) injection 5 mg    DISCONTD: diazePAM (VALIUM) injection 2.5 mg    PHENobarbital (LUMINAL) injection 30 mg    LORazepam (ATIVAN) injection 0.5 mg    glycopyrrolate (ROBINUL) injection 0.2 mg    INITIAL PHYSICIAN ORDER: HOSPICE Level Of Care: Routine; Reason for Admission: Care Giver Breakdown     Standing Status:   Standing     Number of Occurrences:   1     Order Specific Question:   Status     Answer:   Hospice     Order Specific Question:   Level Of Care     Answer:   Routine     Order Specific Question:   Reason for Admission     Answer:   Care Giver Breakdown     Order Specific Question:   Admitting Physician     Answer:   Maile Pillai     Order Specific Question:   Attending Physician     Answer:   Maile Pillai       Allergies: Allergies   Allergen Reactions    Cephalexin Hives    Levofloxacin Unknown (comments)    Penicillin Unknown (comments)    Pravastatin Unknown (comments)       Care Plan:       Multidisciplinary Problems (Active)           Problem: Alteration in Mobility     Dates: Start: 03/24/18       Disciplines: Interdisciplinary    Goal: Remain as independent as possible and remain safe in environment     Dates: Start: 03/24/18      Description: Patient will remain as independent as possible and remain safe in their environment. Disciplines: Interdisciplinary   Intervention: Assess for deficits in mobility    Dates: Start: 03/24/18      Intervention: Instruct on mobility methods    Dates: Start: 03/24/18       Description: Instruct patient/caregiver on mobility methods.     Intervention: PT deepika olivo and treat    Dates: Start: 03/24/18       Description: Consult Physical Therapy for Evaluation and Treatment. Problem: Anticipatory Grief     Dates: Start: 03/24/18       Disciplines: Interdisciplinary    Goal: Explore reactions to and verbalize acceptance of impending loss     Dates: Start: 03/24/18      Description: Patient/family/caregiver will explore reactions to and verbalize acceptance of impending loss. Disciplines: Interdisciplinary   Intervention: Assess grief responses    Dates: Start: 03/24/18      Intervention: Assist with grief counseling    Dates: Start: 03/24/18       Description:  to assist.    Intervention: Geoffrey Garcia on stages of grief    Dates: Start: 03/24/18       Description: Instruct patient/caregiver on stages of grief. Intervention: Offer grief support group    Dates: Start: 03/24/18       Description: Patient/family/caregiver will explore reactions to and verbalize acceptance of impending loss. Problem: Comfort Deficit     Dates: Start: 03/15/18       Disciplines: Interdisciplinary    Goal: Reduce/control pain     Dates: Start: 03/15/18   Expected End: 03/25/18      Description: Patient will report that pain has been reduced or controlled through verbal and nonverbal means and that measures to promote comfort are effective. Disciplines: Interdisciplinary   Intervention: Alternative comfort measures    Dates: Start: 03/15/18       Description: Identify alternative comfort measures with patient/caregiver. Intervention: Instruct on sleeping positions for breathing comforts    Dates: Start: 03/15/18       Description: Instruct patient/caregiver on positions to aide in sleep breathing comfort.     Intervention: Monitor for symptoms of discomfort    Dates: Start: 03/15/18            Problem: Comfort Deficit     Dates: Start: 03/24/18       Disciplines: Interdisciplinary    Goal: Reduce/control pain     Dates: Start: 03/24/18      Description: Patient will report that pain has been reduced or controlled through verbal and nonverbal means and that measures to promote comfort are effective. Disciplines: Interdisciplinary   Intervention: Alternative comfort measures    Dates: Start: 03/24/18       Description: Identify alternative comfort measures with patient/caregiver. Intervention: Instruct on sleeping positions for breathing comforts    Dates: Start: 03/24/18       Description: Instruct patient/caregiver on positions to aide in sleep breathing comfort. Intervention: Monitor for symptoms of discomfort    Dates: Start: 03/24/18            Problem: End of Life Process     Dates: Start: 03/24/18       Disciplines: Interdisciplinary    Goal: Demonstrate understanding of end of life processes     Dates: Start: 03/24/18      Description: Cathy Squires will understand end of life processes. Disciplines: Interdisciplinary   Intervention: Assess for signs/symptoms of terminal restlessness    Dates: Start: 03/24/18      Intervention: Anju Boogie on strategies to reduce terminal restlessness    Dates: Start: 03/24/18      Intervention: Instruct on the dying process    Dates: Start: 03/24/18      Intervention: Instruct: imminent death    Dates: Start: 03/24/18      Intervention: Instruct: process at end of life    Dates: Start: 03/24/18            Problem: Falls - Risk of     Dates: Start: 03/15/18       Disciplines: Interdisciplinary    Goal: *Absence of Falls     Dates: Start: 03/15/18   Expected End: 03/25/18      Description: Document Edward Cao Fall Risk and appropriate interventions in the flowsheet.     Disciplines: Interdisciplinary         Problem: Pain     Dates: Start: 03/17/18       Disciplines: Nurse, Interdisciplinary, RT    Goal: *Control of Pain     Dates: Start: 03/17/18   Expected End: 03/25/18      Disciplines: Nurse, Interdisciplinary, RT   Intervention: Assess pain characteristics (eg: Intensity scale; onset; location; quality; severity; duration; frequency; radiation)    Dates: Start: 03/17/18      Intervention: Support system identification (eg: Caregiver; community resource; family; friends; [employer]; support group)    Dates: Start: 03/17/18      Intervention: Monitor for change in patient condition (eg:  Vital signs changes; changes in level of consciousness; nausea; behavioral changes)    Dates: Start: 03/17/18      Intervention: Medication side-effect assessment    Dates: Start: 03/17/18      Intervention: Pain-relief response reassessment (eg: Frequency based on route of administration; effectiveness)    Dates: Start: 03/17/18      Intervention: Assess pain management - barriers (eg: Past pain experiences) (Discontinued)    Dates: Start: 03/17/18   End: 03/17/18   Intervention: Identify pain expectations (eg: Patient's pain goal; somatic experiences; behavioral changes; affect) (Discontinued)    Dates: Start: 03/17/18   End: 03/17/18   Intervention: Identify pain medication concerns (eg: Cultural considerations; addiction concerns) (Discontinued)    Dates: Start: 03/17/18   End: 03/18/18       Goal: *PALLIATIVE CARE:  Alleviation of Pain     Dates: Start: 03/17/18   Expected End: 03/25/18      Disciplines: Nurse, Interdisciplinary, RT   Intervention: Refer patient for holistic services per facility    Dates: Start: 03/17/18            Problem: Pain     Dates: Start: 03/24/18       Disciplines: Interdisciplinary    Goal: Verbalize satisfaction of level of comfort and symptom control     Dates: Start: 03/24/18      Disciplines: Interdisciplinary   Intervention: Assess effectiveness of pain management    Dates: Start: 03/24/18      Intervention: Assess for signs/symptoms of acute pain    Dates: Start: 03/24/18      Intervention: Assess for signs/symptoms of chronic pain    Dates: Start: 03/24/18      Intervention: Instruct on non-pharmacological pain management    Dates: Start: 03/24/18      Intervention: Instruct on pain scales    Dates: Start: 03/24/18 Intervention: Instruct on pharmacological pain management    Dates: Start: 03/24/18            Problem: Patient Education: Go to Patient Education Activity     Dates: Start: 03/15/18       Disciplines: Interdisciplinary    Goal: Patient/Family Education     Dates: Start: 03/15/18   Expected End: 03/25/18      Disciplines: Interdisciplinary         Problem: Patient Education: Go to Patient Education Activity     Dates: Start: 03/15/18       Disciplines: Interdisciplinary    Goal: Patient/Family Education     Dates: Start: 03/15/18   Expected End: 03/25/18      Disciplines: Interdisciplinary         Problem: Patient Education: Go to Patient Education Activity     Dates: Start: 03/17/18       Disciplines: Nurse, Interdisciplinary, RT    Goal: Patient/Family Education     Dates: Start: 03/17/18   Expected End: 03/25/18      Disciplines: Nurse, Interdisciplinary, RT         Problem: Pressure Injury - Risk of     Dates: Start: 03/15/18       Disciplines: Interdisciplinary    Goal: *Prevention of pressure ulcer     Dates: Start: 03/15/18   Expected End: 03/25/18      Disciplines: Interdisciplinary   Intervention: Pressure injury risk assessment tool    Dates: Start: 03/15/18       Description: (e.g.: Josef Scale)    Intervention: Pressure-relieving device needs assessment    Dates: Start: 03/15/18      Intervention: Pressure-relieving device implementation (eg: Specialty beds; wheel chair cushions; heel lift boots)    Dates: Start: 03/15/18      Intervention: Skin assessment (e.g. existing ulcers, color; integrity; moisture; paul prominences; blisters; friction/shear injuries)    Dates: Start: 03/15/18      Intervention: Skin monitoring and care (e.g. skin cleansing; keep dry, moisturize, utilization of  lotion and/or skin barrier cream)    Dates: Start: 03/15/18      Intervention: Position change (eg: Techniques to position; turn and transfer per schedule; cushion paul prominences; float heels; encourage mobility) Dates: Start: 03/15/18      Intervention: Blood glucose control (eg: Monitoring; medication; nutrition/hydration) (Discontinued)    Dates: Start: 03/15/18   End: 03/16/18   Intervention: Nutrition promotion (eg: Micro/macro nutrient supplementation; encourage oral intake; blood glucose control; nutrition support when indicated) (Discontinued)    Dates: Start: 03/15/18   End: 03/16/18         Problem: Risk for Falls     Dates: Start: 03/24/18       Disciplines: Interdisciplinary    Goal: Free of falls during episode of care     Dates: Start: 03/24/18      Description: Patient will be free of falls during episode of care. Disciplines: Interdisciplinary   Intervention: Assess fall risk    Dates: Start: 03/24/18       Description: Complete fall risk assessment on admission, recertification, and as indicated on fall. Intervention: Instruct mobility    Dates: Start: 03/24/18       Description: Teach patient/caregiver/family techniques to increase patient's mobility: range of motion exercises, assisting with ambulation, proper usage of mobility assistive devices, use of side rails to define bed perimeter and to assist with turning and positioning. Intervention: Instruct on fall prevention    Dates: Start: 03/24/18       Description: Instruct patient/family in fall prevention strategies:  lighted hallways, remove throw rugs, monitor medication that may alter mental status. ___________________    Care Team Notes          POC/IDG Notes      [facility] IDG Nurse Notes by Kendall Harmon at 03/22/18 0627  Version 1 of 1    Author:  Kendall Harmon Service:  (none) Author Type:  Registered Nurse    Filed:  03/22/18 2869 Date of Service:  03/22/18 0627 Status:  Signed    :  Kendall Harmon (Registered Nurse)           Patient: Milli Shaffer    Date: 03/22/18  Time: 6:27 AM    [facility] Nurse Notes  This patient was admitted with a diagnosis of Lewy Body Dementia on 3/15/18.  Pt had to switch from Respite to GIP due to uncontrolled pain and agitation, but was able to switch to Routine LOC as of today since pain is controlled with recently initiated Dilaudid 1 mg iv, as well as Haldol and Ativan. Phenobarbital initiated yesterday for twitching and nonpurposeful movement of upper extremities. Pt has not shown any s/s of pain or agitation, only responds to noxious stimuli (turning) by grimacing. Overall appears very comfortable at the moment. Pt is a brain donor. Protocol in place (in [id number]) on what to do after patient has passed to ensure timely transport to [facility] to harvest brain. Robin Perkins servicing the family and will need to transport pt's body to [facility]. Pt's wife, Chani Zhou, [place], is coping appropriately.         Signed by: Deisy Pearson

## 2018-03-28 NOTE — PROGRESS NOTES
1900: Shift report received from [address]: Wife here visiting with patient. Pt appears comfortable at this time  2020: Pt medicated with scheduled Dilaudid. 2130: Resting with no s/s of pain or discomfort. 0330: Pt medicated with scheduled meds. Respirations have increased to 24 bpm. No grimacing, no moaning, no eye opening. Feet are cooler, but no mottling noted. Pt generally appears comfortable. 3041: Pt found with no respirations and no heartbeat on auscultation. Pronounced by this nurse at this time. Angelika Malone RN notified [facility] . Pt is a brain donor and needs to be transported to [facility] for autopsy. Post mortem care provided by Emily Jones CNA, and per FH, not placed in body bag. Wife Sharon Avalos notified about her  passing. She was informed that [facility] is on their way to  body for transport to [facility]. Brain Tissue resource facility Director will be notified by Tushar Perkins.        NAME OF PATIENT:  Ramos Rojas    LEVEL OF CARE:  Routine    O2 SAFETY:  n/a    FALL INTERVENTIONS PROVIDED:   Implemented/recommended use of fall risk identification flag to all team members, Implemented/recommended assistive devices and encouraged their use, Implemented/recommended resources for alarm system (personal alarm, bed alarm, call bell, etc.)  and Implemented/recommended environmental changes (remove hazards, lower bed, improve lighting, etc.)    INTERDISPLINARY COMMUNICATION/COLLABORATION:  Physician, MSW, [place] and RN, CNA    NEW MEDICATION INITIATION DOCUMENTATION:  no changes made at this time    COMFORTABLE DYING MEASURE:  Is Patient/family satisfied with symptom level?  yes    DISCHARGE PLAN:  End of life at the [facility]

## 2018-03-28 NOTE — PROGRESS NOTES
1900: Shift report received from OLEGARIO Gomez.  2000: Pt in bed, wife at the bedside. Pt unresponsive, not displaying any s/s of pain or agitation. Appears comfortable. Breathing regular and non-labored. Medicated with scheduled meds. 2200: Medicated with scheduled meds. 0500: Pt has been resting comfortably on all rounds, pain well managed with scheduled meds, no PRNs for pain or agitation necessary. Bed bath given, tolerated well. Stage II on sacrum has new dressing, old one was soiled with small amount of drainage. Skin otherwise intact. Pt has elevated temp at 102.9. PRN Tylenol administered. 0645: Scheduled meds administered. Pt appears very comfortable.      NAME OF PATIENT:  Matt Linares    LEVEL OF CARE:  Routine    O2 SAFETY:  n/a    FALL INTERVENTIONS PROVIDED:   Implemented/recommended resources for alarm system (personal alarm, bed alarm, call bell, etc.)  and Implemented/recommended environmental changes (remove hazards, lower bed, improve lighting, etc.)    INTERDISPLINARY COMMUNICATION/COLLABORATION:  Physician, MSW, [place] and RN, CNA    NEW MEDICATION INITIATION DOCUMENTATION:  no changes made on this shift    COMFORTABLE DYING MEASURE:  Is Patient/family satisfied with symptom level?  yes    DISCHARGE PLAN:  End of life at the [facility]

## 2018-03-28 NOTE — PROGRESS NOTES
0700  Report received. 0745  Pt lying in bed, unresponsive. No facial grimacing or moaning at this time. Lungs clear but diminished. No cough noted. Bowel sounds absent. [place]is is draining a scant amt of he urine. No edema noted. Skin is warm to touch. Pt has an Iv in his right wrist.  Dressing is dry and intact. 9169  Pt medicated with scheduled dilaudid. 56  Pt's wife at the bedside. Rn educated on signs and symptoms of the dying process and EOL care. 200  Pt's son, daughter-in-law and Wife at the bedside. Support and education given. 1400  Pt turned and repositioned. No signs of distress. Respirations shallow, skin is warm to touch. 1630  Pt resting. No distress. 1750  Pt medicated with scheduled meds. Pt is resting comfortably. No signs of distress. 1830  Pt resting. 1900  Report given. NAME OF PATIENT:  Marylee Herder    LEVEL OF CARE:  Routine    REASON FOR GIP: n/a    *PATIENT REMAINS ELIGIBLE FOR [facility] LEVEL OF CARE AS EVIDENCED BY: (MUST BE ADDRESSED OF PATIENT GIP)  n/a      REASON FOR RESPITE:  n/a    O2 SAFETY:  n/a    FALL INTERVENTIONS PROVIDED:   Implemented/recommended resources for alarm system (personal alarm, bed alarm, call bell, etc.)  and Implemented/recommended environmental changes (remove hazards, lower bed, improve lighting, etc.)    INTERDISPLINARY COMMUNICATION/COLLABORATION:  Physician, MSW, [place] and RN, CNA    NEW MEDICATION INITIATION DOCUMENTATION:  No new medication initiated. Reason medication is being initiated:  N/a  \  MD / Provider name consulted re: change in status / initiation of new medication:  n/a    New Symptom(s):  n/a    New Order(s):  n/a    Name of the person notified of the changes:  n/a    Name of person being taught:  n/a    Instructions given:  n/a    Side Effects taught:  n/a    Response to teaching:  n/a      COMFORTABLE DYING MEASURE:  Is Patient/family satisfied with symptom level? yes    DISCHARGE PLAN:  Pt will remain at the [facility] for EOL care.

## 2018-03-29 NOTE — PROGRESS NOTES
1245-13:15    3/29/18  BR Telephone contact      I called the wife of this patient, Dewey Doty to express my condolences and sympathies at the passing of her , Nabila Escalera. She was appreciative for my call as well as my officiating at the [facility] held for Nabila Escalera on Sunday. She also asked me to thank all of the staff for the care and support given to her and to Nabila Escalera during his stay at the [facility]. I reminded her of our bereavement programs and support.      PLAN:  Respond to requests for support as received    Celsa Reyes MTS, [place]

## 2018-03-30 ENCOUNTER — HOME CARE VISIT (OUTPATIENT)
Dept: HOSPICE | Facility: HOSPICE | Age: 78
End: 2018-03-30
Payer: COMMERCIAL

## 2018-04-02 ENCOUNTER — HOME CARE VISIT (OUTPATIENT)
Dept: HOSPICE | Facility: HOSPICE | Age: 78
End: 2018-04-02
Payer: COMMERCIAL

## 2018-04-03 ENCOUNTER — HOME CARE VISIT (OUTPATIENT)
Dept: HOSPICE | Facility: HOSPICE | Age: 78
End: 2018-04-03
Payer: COMMERCIAL

## 2018-04-16 ENCOUNTER — HOME CARE VISIT (OUTPATIENT)
Dept: HOSPICE | Facility: HOSPICE | Age: 78
End: 2018-04-16
Payer: COMMERCIAL

## 2021-01-01 NOTE — DISCHARGE SUMMARY
Discharge Summary    [facility]  Good Help to Those in Need  [phone]      Date of Admission: 3/15/2018  Date of Discharge: 3/29/2018    Belinda Lane is a 69y.o. year old who was admitted to [facility] at [facility] with a Hospice diagnosis of Lewy Body Dementia. Pt was admitted for routine hospice care with symptoms as follows      Belinda Lane is a 69y.o. year old who was admitted to [facility].      The patient's principle diagnosis has resulted in decline in function, pt is unable to ambulate as he has progressive weakness and muscle wasting, poor PO intake and dehydration, hallucinations and paranoid delusions have been ongoing and recently have been worse, he is combative, he has a new sacral wound with drainage noted and is on an antibiotic, he is dependence for all ADls, he needs support when sitting up in a chair, he has progressive stiffness in his muscles and rigidity, new areas of skin breakdown, has fallen recently and is now no longer able to walk, worsening behavioral disturbances.         Functionally, the patient's Karnofsky and/or Palliative Performance Scale has declined over a period of months  and is estimated at 30%. The patient is dependent on the following ADLs: he is dependent for all ALDS.     Objective information that support this patients limited prognosis includes:   Pt with hx of Lewy Body disease       The patient/family chose comfort measures with the support of Hospice.      HOSPICE DIAGNOSES   Active Symptoms:  1. Agitation  2. Weakness and fatigue  3. Anxiety  4. Hallucinations as described by spouse      PLAN   1. Pt is admitted routine level of care from [address] [place] [address]  2. Comfort care and comfort medications  3. Treat behavioral disturbances and hallucinations with Seroquel and haldol  4. Treat anxiety with lorazepam  5. Continue Cipro for sacral wound infection  6.    and LCSW to support family needs         The patient's care was focused on comfort and the patient passed away on 3/29/2018. Yes